# Patient Record
Sex: MALE | Race: WHITE | Employment: STUDENT | URBAN - METROPOLITAN AREA
[De-identification: names, ages, dates, MRNs, and addresses within clinical notes are randomized per-mention and may not be internally consistent; named-entity substitution may affect disease eponyms.]

---

## 2017-10-19 ENCOUNTER — TELEPHONE (OUTPATIENT)
Dept: FAMILY MEDICINE CLINIC | Facility: CLINIC | Age: 32
End: 2017-10-19

## 2017-11-28 ENCOUNTER — OFFICE VISIT (OUTPATIENT)
Dept: FAMILY MEDICINE CLINIC | Facility: CLINIC | Age: 32
End: 2017-11-28

## 2017-11-28 ENCOUNTER — LAB ENCOUNTER (OUTPATIENT)
Dept: LAB | Age: 32
End: 2017-11-28
Attending: FAMILY MEDICINE
Payer: COMMERCIAL

## 2017-11-28 VITALS
TEMPERATURE: 98 F | RESPIRATION RATE: 14 BRPM | SYSTOLIC BLOOD PRESSURE: 130 MMHG | HEIGHT: 70.5 IN | BODY MASS INDEX: 24.63 KG/M2 | HEART RATE: 68 BPM | DIASTOLIC BLOOD PRESSURE: 80 MMHG | WEIGHT: 174 LBS

## 2017-11-28 DIAGNOSIS — Z00.00 WELLNESS EXAMINATION: ICD-10-CM

## 2017-11-28 DIAGNOSIS — Z00.00 WELLNESS EXAMINATION: Primary | ICD-10-CM

## 2017-11-28 PROCEDURE — 84443 ASSAY THYROID STIM HORMONE: CPT

## 2017-11-28 PROCEDURE — 85025 COMPLETE CBC W/AUTO DIFF WBC: CPT

## 2017-11-28 PROCEDURE — 82607 VITAMIN B-12: CPT

## 2017-11-28 PROCEDURE — 82306 VITAMIN D 25 HYDROXY: CPT

## 2017-11-28 PROCEDURE — 80053 COMPREHEN METABOLIC PANEL: CPT

## 2017-11-28 PROCEDURE — 80061 LIPID PANEL: CPT

## 2017-11-28 PROCEDURE — 36415 COLL VENOUS BLD VENIPUNCTURE: CPT

## 2017-11-28 PROCEDURE — 99395 PREV VISIT EST AGE 18-39: CPT | Performed by: FAMILY MEDICINE

## 2017-11-28 NOTE — PATIENT INSTRUCTIONS
Thank you for choosing Rosa Blas MD at [place] [address]  To Do: Clifton Suarez  1. Please see age appropriate health prevention info below  2.  Please have labs done as ordered  Effective 6/19/17 until November 2017  Due to Sara Mendoza • Please call our office about any questions regarding your treatment/medicines/tests as a result of today's visit.  For your safety, read the entire package insert of all medicines prescribed to you and be aware of all of the risks of treatment even beyon Screening tests and vaccines are an important part of managing your health. Health counseling is essential, too. Below are guidelines for these, for men ages 25 to 44. Talk with your healthcare provider to make sure you’re up-to-date on what you need.   Scr Hepatitis B Men at increased risk for infection – talk with your healthcare provider 3 doses over 6 months; second dose should be given 1 month after the first dose; the third dose should be given at least 2 months after the second dose and at least 4 juan © 9403-0328 [employer] [address]. [address] [facility] [place], [facility] [place]. All rights reserved. This information is not intended as a substitute for professional medical care. Always follow your healthcare professional's instructions.

## 2017-11-28 NOTE — H&P
Wellness Exam    REASON FOR VISIT:    Jacqui Chaidez is a 28year old male who presents for an [address]     Current Complaints: none; here for physical  Flu Shot: see immunization record  Health Maintenance Topics with due status: Overdue with Risk Recommendation Internal Lab or Procedure External Lab or Procedure   Cholesterol Screening Recommended screening varies with age, risk and gender LDL-CHOLESTEROL (mg/dL (calc))   Date Value   11/23/2015 96       Diabetes Screening  If history of palpitations. Gastrointestinal: Negative for nausea, vomiting, abdominal pain and diarrhea. Genitourinary: Negative for urgency, frequency and difficulty urinating. Musculoskeletal: Negative for arthralgias and no gait problem.    Skin: Negative for c male  Age appropriate cancer screening, labs, safety, immunizations were discussed with the patient and ordered as follows:  Diagnoses and all orders for this visit:    Wellness examination  -     CBC WITH DIFFERENTIAL WITH PLATELET;  Future  -     COMP MET

## 2018-09-27 ENCOUNTER — OFFICE VISIT (OUTPATIENT)
Dept: FAMILY MEDICINE CLINIC | Facility: CLINIC | Age: 33
End: 2018-09-27
Payer: COMMERCIAL

## 2018-09-27 ENCOUNTER — LAB ENCOUNTER (OUTPATIENT)
Dept: LAB | Age: 33
End: 2018-09-27
Attending: FAMILY MEDICINE
Payer: COMMERCIAL

## 2018-09-27 VITALS
RESPIRATION RATE: 16 BRPM | TEMPERATURE: 98 F | WEIGHT: 172 LBS | SYSTOLIC BLOOD PRESSURE: 122 MMHG | BODY MASS INDEX: 24.35 KG/M2 | HEART RATE: 58 BPM | HEIGHT: 70.5 IN | DIASTOLIC BLOOD PRESSURE: 84 MMHG

## 2018-09-27 DIAGNOSIS — Z13.29 SCREENING FOR ENDOCRINE, METABOLIC AND IMMUNITY DISORDER: ICD-10-CM

## 2018-09-27 DIAGNOSIS — Z13.228 SCREENING FOR ENDOCRINE, METABOLIC AND IMMUNITY DISORDER: ICD-10-CM

## 2018-09-27 DIAGNOSIS — Z13.0 SCREENING FOR ENDOCRINE, METABOLIC AND IMMUNITY DISORDER: ICD-10-CM

## 2018-09-27 DIAGNOSIS — Z23 NEED FOR INFLUENZA VACCINATION: ICD-10-CM

## 2018-09-27 DIAGNOSIS — Z00.00 WELLNESS EXAMINATION: Primary | ICD-10-CM

## 2018-09-27 PROBLEM — R03.0 ELEVATED BLOOD PRESSURE READING: Status: ACTIVE | Noted: 2018-09-27

## 2018-09-27 LAB
ALBUMIN SERPL-MCNC: 4.2 G/DL (ref 3.5–4.8)
ALBUMIN/GLOB SERPL: 1.1 {RATIO} (ref 1–2)
ALP LIVER SERPL-CCNC: 79 U/L (ref 45–117)
ALT SERPL-CCNC: 32 U/L (ref 17–63)
ANION GAP SERPL CALC-SCNC: 6 MMOL/L (ref 0–18)
AST SERPL-CCNC: 16 U/L (ref 15–41)
BASOPHILS # BLD AUTO: 0.05 X10(3) UL (ref 0–0.1)
BASOPHILS NFR BLD AUTO: 0.8 %
BILIRUB SERPL-MCNC: 1 MG/DL (ref 0.1–2)
BUN BLD-MCNC: 12 MG/DL (ref 8–20)
BUN/CREAT SERPL: 12 (ref 10–20)
CALCIUM BLD-MCNC: 8.7 MG/DL (ref 8.3–10.3)
CHLORIDE SERPL-SCNC: 104 MMOL/L (ref 101–111)
CHOLEST SMN-MCNC: 181 MG/DL (ref ?–200)
CO2 SERPL-SCNC: 29 MMOL/L (ref 22–32)
CREAT BLD-MCNC: 1 MG/DL (ref 0.7–1.3)
EOSINOPHIL # BLD AUTO: 0.14 X10(3) UL (ref 0–0.3)
EOSINOPHIL NFR BLD AUTO: 2.1 %
ERYTHROCYTE [DISTWIDTH] IN BLOOD BY AUTOMATED COUNT: 12.9 % (ref 11.5–16)
GLOBULIN PLAS-MCNC: 3.9 G/DL (ref 2.5–4)
GLUCOSE BLD-MCNC: 75 MG/DL (ref 70–99)
HAV AB SERPL IA-ACNC: 507 PG/ML (ref 193–986)
HCT VFR BLD AUTO: 49.9 % (ref 37–53)
HDLC SERPL-MCNC: 56 MG/DL (ref 40–59)
HGB BLD-MCNC: 16.1 G/DL (ref 13–17)
IMMATURE GRANULOCYTE COUNT: 0.01 X10(3) UL (ref 0–1)
IMMATURE GRANULOCYTE RATIO %: 0.2 %
LDLC SERPL CALC-MCNC: 112 MG/DL (ref ?–100)
LYMPHOCYTES # BLD AUTO: 1.96 X10(3) UL (ref 0.9–4)
LYMPHOCYTES NFR BLD AUTO: 29.9 %
M PROTEIN MFR SERPL ELPH: 8.1 G/DL (ref 6.1–8.3)
MCH RBC QN AUTO: 30.6 PG (ref 27–33.2)
MCHC RBC AUTO-ENTMCNC: 32.3 G/DL (ref 31–37)
MCV RBC AUTO: 94.7 FL (ref 80–99)
MONOCYTES # BLD AUTO: 0.67 X10(3) UL (ref 0.1–1)
MONOCYTES NFR BLD AUTO: 10.2 %
NEUTROPHIL ABS PRELIM: 3.72 X10 (3) UL (ref 1.3–6.7)
NEUTROPHILS # BLD AUTO: 3.72 X10(3) UL (ref 1.3–6.7)
NEUTROPHILS NFR BLD AUTO: 56.8 %
NONHDLC SERPL-MCNC: 125 MG/DL (ref ?–130)
OSMOLALITY SERPL CALC.SUM OF ELEC: 286 MOSM/KG (ref 275–295)
PLATELET # BLD AUTO: 270 10(3)UL (ref 150–450)
POTASSIUM SERPL-SCNC: 4.1 MMOL/L (ref 3.6–5.1)
RBC # BLD AUTO: 5.27 X10(6)UL (ref 4.3–5.7)
RED CELL DISTRIBUTION WIDTH-SD: 45.1 FL (ref 35.1–46.3)
SODIUM SERPL-SCNC: 139 MMOL/L (ref 136–144)
TRIGL SERPL-MCNC: 64 MG/DL (ref 30–149)
TSI SER-ACNC: 4.41 MIU/ML (ref 0.35–5.5)
VIT D+METAB SERPL-MCNC: 36.6 NG/ML (ref 30–100)
VLDLC SERPL CALC-MCNC: 13 MG/DL (ref 0–30)
WBC # BLD AUTO: 6.6 X10(3) UL (ref 4–13)

## 2018-09-27 PROCEDURE — 36415 COLL VENOUS BLD VENIPUNCTURE: CPT

## 2018-09-27 PROCEDURE — 82607 VITAMIN B-12: CPT

## 2018-09-27 PROCEDURE — 80061 LIPID PANEL: CPT

## 2018-09-27 PROCEDURE — 80053 COMPREHEN METABOLIC PANEL: CPT

## 2018-09-27 PROCEDURE — 84443 ASSAY THYROID STIM HORMONE: CPT

## 2018-09-27 PROCEDURE — 99395 PREV VISIT EST AGE 18-39: CPT | Performed by: FAMILY MEDICINE

## 2018-09-27 PROCEDURE — 85025 COMPLETE CBC W/AUTO DIFF WBC: CPT

## 2018-09-27 PROCEDURE — 82306 VITAMIN D 25 HYDROXY: CPT

## 2018-09-27 NOTE — H&P
Wellness Exam    REASON FOR VISIT:    Robert Danielle is a 35year old male who presents for an [address]     Current Complaints: Mr. Elena Sutton is a pleasant 34 y/o M here for his wellness  Flu Shot: see immunization record  Health Maintenance To or Procedure External Lab or Procedure   Colonoscopy Screen Every 10 years There are no preventive care reminders to display for this patient. Flex Sigmoidoscopy Screen  Every 5 years No results found for this or any previous visit.     Fecal Occult Bloo History    Tobacco Use      Smoking status: Former Smoker      Smokeless tobacco: Never Used      Tobacco comment: quit about 7 years ago     Alcohol use: Yes    Drug use: No         REVIEW OF SYSTEMS:   Constitutional: Negative for fever, chills and fatig adenopathy. Neurological: He is alert and oriented to person, place, and time. He has normal reflexes. Skin: Skin is warm. No rash noted. No erythema. with normal hair  Psychiatric: He has a normal mood and affect.  His behavior is normal.     ASSESSMEN includes: annual visits for fasting labs  Influenza Vaccine(1) due on 09/01/2018  Annual Depression Screen due on 11/28/2018  Annual Physical due on 11/28/2018  Patient/Caregiver Education:  Patient/Caregiver Education: There are no barriers to learning.  [facility]

## 2018-09-27 NOTE — PATIENT INSTRUCTIONS
Thank you for choosing Onofre Granados MD at [place]  To Do: Jayda Ovalle  1. Please see age appropriate health prevention below   2. High Blood pressure  What Is a Normal Blood Pressure?   [employer] on Prevention, Detection blood vessel that supplies blood to the abdomen, pelvis, and legs   Heart failure   Poor blood supply to the legs  Erectile Dysfunction  Problems with your vision    Overview  \"Blood pressure\" is the force of blood pushing against the walls of the arteri than the typical American diet. Limit sodium to no more than 2,300 mg a day (eating only 1,500 mg a day is an even better goal). Reduce saturated fat to no more than 6% of daily calories and total fat to 27% of daily calories.  Low-fat dairy products edmond hearty blend of vegetables served over brown rice or whole-wheat noodles can serve as the main dish for a meal.   • Fresh or frozen vegetables are both good choices.  When buying frozen and canned vegetables, choose those labeled as low sodium or without ad which can reduce or prevent the symptoms of lactose intolerance. • Go easy on regular and even fat-free cheeses because they are typically high in sodium.    Lean meat, poultry and fish (6 or fewer servings a day)  Meat can be a rich source of protein, B that has been shown to have some health benefits. Fats and oils (2 to 3 servings a day)  Fat helps your body absorb essential vitamins and helps your body's immune system. But too much fat increases your risk of heart disease, diabetes and obesity.  The D nutritional value but can pack on calories. DASH diet: Alcohol and caffeine  Drinking too much alcohol can increase blood pressure. The DASH diet recommends that men limit alcohol to two or fewer drinks a day and women one or less.    The DASH diet doesn' low-sodium food and beverages.  If things seem too bland, gradually introduce low-sodium foods and cut back on table salt until you reach your sodium goal. That'll give your palate time to adjust. It can take several weeks for your taste buds to get used to pharmacist and our office with questions, and to notify us and stop treatment if problems arise, but know that our intention is that the benefits outweigh those potential risks and we strive to make you healthier and to improve your quality of life.     Ref sure you’re up-to-date on what you need.   Screening Who needs it How often   Alcohol misuse All men in this age group At routine exams   Blood pressure All men in this age group Yearly checkup if your blood pressure is normal  Normal blood pressure is less increased risk for infection – talk with your healthcare provider 1 to 3 doses   Human papillomavirus (HPV) All men in this age group up to age 32 3 doses; the second dose should be given 1 to 2 months after the first dose and the third dose given 6 months an important part of managing your health. A screening test is done to find possible disorders or diseases in people who don't have any symptoms.  The goal is to find a disease early so lifestyle changes can be made and you can be watched more closely to re no record of this infection or vaccine 2 doses; the second dose should be given at least 4 weeks after the first dose   Hepatitis A Men at increased risk for infection – talk with your healthcare provider 2 doses given at least 6 months apart   Hepatitis B fair-skinned adults through age 25 At routine exams   1Those who are 25years of age, who are not up-to-date on their childhood immunizations, should receive all appropriate catch-up vaccines recommended by the CDC.   Date Last Reviewed: 2/1/2017  © 2000-20

## 2019-10-10 ENCOUNTER — TELEPHONE (OUTPATIENT)
Dept: FAMILY MEDICINE CLINIC | Facility: CLINIC | Age: 34
End: 2019-10-10

## 2019-10-10 ENCOUNTER — HOSPITAL ENCOUNTER (OUTPATIENT)
Age: 34
Discharge: STILL ADMITTED OR ADMINISTRATIVE | End: 2019-10-10
Payer: COMMERCIAL

## 2019-10-10 NOTE — TELEPHONE ENCOUNTER
Patients wife informed of MD recommendations below, patients wife verbalized understanding with intent to comply. Offered opportunity to ask questions, all questions were answered. No future appointments.       Dr Eddi Rudd  Address: Varsha Gomez [address] [place] [address]

## 2019-10-10 NOTE — TELEPHONE ENCOUNTER
Aquilino Cr- Pt's son scratched Dads eye. Pt has a question regarding scheduling an appt. Please call [address] [facility] Wife.

## 2019-10-10 NOTE — TELEPHONE ENCOUNTER
Patients wife would like to know if Dr Daniel Martinez has the equipment to look at patients eye and check for corneal abrasion or if MD would prefer for pt to see a specialists. If so who does he recommend?

## 2019-11-14 ENCOUNTER — OFFICE VISIT (OUTPATIENT)
Dept: FAMILY MEDICINE CLINIC | Facility: CLINIC | Age: 34
End: 2019-11-14
Payer: COMMERCIAL

## 2019-11-14 ENCOUNTER — LAB ENCOUNTER (OUTPATIENT)
Dept: LAB | Age: 34
End: 2019-11-14
Attending: FAMILY MEDICINE
Payer: COMMERCIAL

## 2019-11-14 VITALS
WEIGHT: 168 LBS | HEIGHT: 70.5 IN | RESPIRATION RATE: 16 BRPM | DIASTOLIC BLOOD PRESSURE: 92 MMHG | BODY MASS INDEX: 23.78 KG/M2 | HEART RATE: 70 BPM | TEMPERATURE: 98 F | SYSTOLIC BLOOD PRESSURE: 152 MMHG

## 2019-11-14 DIAGNOSIS — R03.0 ELEVATED BLOOD PRESSURE READING: ICD-10-CM

## 2019-11-14 DIAGNOSIS — Z00.00 WELLNESS EXAMINATION: Primary | ICD-10-CM

## 2019-11-14 DIAGNOSIS — Z00.00 WELLNESS EXAMINATION: ICD-10-CM

## 2019-11-14 PROCEDURE — 36415 COLL VENOUS BLD VENIPUNCTURE: CPT

## 2019-11-14 PROCEDURE — 80053 COMPREHEN METABOLIC PANEL: CPT

## 2019-11-14 PROCEDURE — 82306 VITAMIN D 25 HYDROXY: CPT

## 2019-11-14 PROCEDURE — 99395 PREV VISIT EST AGE 18-39: CPT | Performed by: FAMILY MEDICINE

## 2019-11-14 PROCEDURE — 84443 ASSAY THYROID STIM HORMONE: CPT

## 2019-11-14 PROCEDURE — 80061 LIPID PANEL: CPT

## 2019-11-14 PROCEDURE — 85025 COMPLETE CBC W/AUTO DIFF WBC: CPT

## 2019-11-14 PROCEDURE — 82607 VITAMIN B-12: CPT

## 2019-11-14 PROCEDURE — 84439 ASSAY OF FREE THYROXINE: CPT

## 2019-11-14 NOTE — PROGRESS NOTES
Wellness Exam    REASON FOR VISIT:    Ed Martinez is a 29year old male who presents for an [address]     Current Complaints: Mr. Narcisa Newton is a pleasant 28 y/o M here for his wellness exam  Flu Shot: see immunization record  Ruth Ann [address] the last two weeks)?: Not at all    Feeling down, depressed, or hopeless (over the last two weeks)?: Not at all    PHQ-2 SCORE: 0              PREVENTATIVE SERVICES  INDICATIONS AND SCHEDULE Recommendation Internal Lab or Procedure External Lab or Procedur Relation Age of Onset   • Hypertension Father    • High Cholesterol Father    • Stroke Maternal Grandmother    • Cancer Paternal Grandmother         Lung Cancer- Smoker   • High Cholesterol Paternal Grandfather       SOCIAL HISTORY:   Social History    Tob no rales. Abdominal: Soft. Bowel sounds are normal. There is no tenderness. Musculoskeletal: Normal range of motion. He exhibits no edema. Lymphadenopathy:     He has no cervical adenopathy.    Neurological: He is alert and oriented to person, place, understanding. Educated by: MD   The patient indicates understanding of these issues and agrees to the plan.     SUGGESTED VACCINATIONS - Influenza, Pneumococcal, Zoster, Tetanus     Immunization History   Administered Date(s) Administered   • >=3 YRS FL

## 2019-11-14 NOTE — PATIENT INSTRUCTIONS
Thank you for choosing Maria M Hassan MD at [place] [address]  To Do: Javier Benz  1. Please see age appropriate health prevention below    [facility] is located in [address]. Monday, Tuesday & Friday – 8 a.m. to 4 p.m.   Wednesday, Thursda the benefits outweigh those potential risks and we strive to make you healthier and to improve your quality of life.     Referrals, and Radiology Information:    If your insurance requires a referral to a specialist, please allow 5 business days to process exams   Blood pressure All men in this age group Yearly checkup if your blood pressure is normal  Normal blood pressure is less than 120/80  If your blood pressure is higher than normal, follow the advice of your healthcare provider.     All men in this ag up to age 32 3 doses; the second dose should be given 1 to 2 months after the first dose and the third dose given 6 months after the first dose   Influenza (flu) All men in this age group Once a year   Measles, mumps, rubella (MMR) All men in this age [de-identified]

## 2019-11-15 DIAGNOSIS — E03.9 HYPOTHYROIDISM, UNSPECIFIED TYPE: Primary | ICD-10-CM

## 2019-11-15 RX ORDER — LEVOTHYROXINE SODIUM 0.05 MG/1
50 TABLET ORAL
Qty: 90 TABLET | Refills: 0 | Status: SHIPPED | OUTPATIENT
Start: 2019-11-15 | End: 2019-11-18

## 2019-11-18 ENCOUNTER — TELEPHONE (OUTPATIENT)
Dept: FAMILY MEDICINE CLINIC | Facility: CLINIC | Age: 34
End: 2019-11-18

## 2019-11-18 DIAGNOSIS — E03.9 HYPOTHYROIDISM, UNSPECIFIED TYPE: ICD-10-CM

## 2019-11-18 RX ORDER — LEVOTHYROXINE SODIUM 0.05 MG/1
50 TABLET ORAL
Qty: 90 TABLET | Refills: 0 | Status: SHIPPED | OUTPATIENT
Start: 2019-11-18 | End: 2020-02-20

## 2019-11-18 NOTE — TELEPHONE ENCOUNTER
RX sent to [facility] pharmacy instead.    Cancelled rx at [facility]

## 2019-11-18 NOTE — TELEPHONE ENCOUNTER
Levothyroxine Sodium 50 MCG Oral Tab    [facility] [id number] [facility] - [facility] [address] [phone], [phone]  ______________________________    Per insurance. .. please send the script to [facility] in [facility].

## 2019-12-19 ENCOUNTER — OFFICE VISIT (OUTPATIENT)
Dept: FAMILY MEDICINE CLINIC | Facility: CLINIC | Age: 34
End: 2019-12-19
Payer: COMMERCIAL

## 2019-12-19 VITALS
TEMPERATURE: 99 F | BODY MASS INDEX: 24.49 KG/M2 | HEIGHT: 70.5 IN | RESPIRATION RATE: 16 BRPM | SYSTOLIC BLOOD PRESSURE: 146 MMHG | WEIGHT: 173 LBS | DIASTOLIC BLOOD PRESSURE: 92 MMHG | HEART RATE: 70 BPM

## 2019-12-19 DIAGNOSIS — I10 ESSENTIAL HYPERTENSION: Primary | ICD-10-CM

## 2019-12-19 PROBLEM — E03.9 HYPOTHYROIDISM: Status: ACTIVE | Noted: 2019-12-19

## 2019-12-19 PROCEDURE — 99213 OFFICE O/P EST LOW 20 MIN: CPT | Performed by: FAMILY MEDICINE

## 2019-12-19 RX ORDER — HYDROCHLOROTHIAZIDE 25 MG/1
25 TABLET ORAL DAILY
Qty: 90 TABLET | Refills: 1 | Status: SHIPPED | OUTPATIENT
Start: 2019-12-19 | End: 2020-03-18

## 2019-12-19 NOTE — PROGRESS NOTES
HPI:    Patient ID: Shayy Le is a 29year old male. HPI  Mr. Nelson Ruvalcaba is a pleasant 51-year-old gentleman who I saw previously for a physical but noted to have elevated blood pressure.   I did ask him to follow-up today to have his blood pressure reev General: No edema. Lymphadenopathy:     He has no cervical adenopathy. Neurological: He is alert. Vitals reviewed.              ASSESSMENT/PLAN:   Essential hypertension  (primary encounter diagnosis)  -I had asked him to review his current die

## 2019-12-19 NOTE — PATIENT INSTRUCTIONS
Thank you for choosing Makeda Eagle MD at [place] [address]  To Do: Sully Pereira  1. High Blood pressure  What Is a Normal Blood Pressure?   The Joint National Committee on Prevention, Detection, Evaluation, and Treatment of High Blood Pressure has c pelvis, and legs   Heart failure   Poor blood supply to the legs  Erectile Dysfunction  Problems with your vision    Overview  \"Blood pressure\" is the force of blood pushing against the walls of the arteries as the heart pumps blood.  If this pressure ris no more than 2,300 mg a day (eating only 1,500 mg a day is an even better goal). Reduce saturated fat to no more than 6% of daily calories and total fat to 27% of daily calories.  Low-fat dairy products appear to be especially beneficial for lowering syst rice or whole-wheat noodles can serve as the main dish for a meal.   • Fresh or frozen vegetables are both good choices. When buying frozen and canned vegetables, choose those labeled as low sodium or without added salt.    • To increase the number of servi lactose intolerance. • Go easy on regular and even fat-free cheeses because they are typically high in sodium.    Lean meat, poultry and fish (6 or fewer servings a day)  Meat can be a rich source of protein, B vitamins, iron and zinc. But because even le benefits. Fats and oils (2 to 3 servings a day)  Fat helps your body absorb essential vitamins and helps your body's immune system. But too much fat increases your risk of heart disease, diabetes and obesity.  The DASH diet strives for a healthy balance b DASH diet: Alcohol and caffeine  Drinking too much alcohol can increase blood pressure. The DASH diet recommends that men limit alcohol to two or fewer drinks a day and women one or less. The DASH diet doesn't address caffeine consumption.  The influenc bland, gradually introduce low-sodium foods and cut back on table salt until you reach your sodium goal. That'll give your palate time to adjust. It can take several weeks for your taste buds to get used to less salty foods.      [facility] is loc notify us and stop treatment if problems arise, but know that our intention is that the benefits outweigh those potential risks and we strive to make you healthier and to improve your quality of life.     Referrals, and Radiology Information:    If your ins

## 2020-02-13 ENCOUNTER — APPOINTMENT (OUTPATIENT)
Dept: LAB | Age: 35
End: 2020-02-13
Attending: FAMILY MEDICINE
Payer: COMMERCIAL

## 2020-02-13 ENCOUNTER — OFFICE VISIT (OUTPATIENT)
Dept: FAMILY MEDICINE CLINIC | Facility: CLINIC | Age: 35
End: 2020-02-13
Payer: COMMERCIAL

## 2020-02-13 VITALS
SYSTOLIC BLOOD PRESSURE: 130 MMHG | HEART RATE: 74 BPM | WEIGHT: 175 LBS | BODY MASS INDEX: 24.77 KG/M2 | RESPIRATION RATE: 16 BRPM | HEIGHT: 70.5 IN | DIASTOLIC BLOOD PRESSURE: 88 MMHG | TEMPERATURE: 98 F

## 2020-02-13 DIAGNOSIS — E03.9 HYPOTHYROIDISM, UNSPECIFIED TYPE: ICD-10-CM

## 2020-02-13 DIAGNOSIS — I10 ESSENTIAL HYPERTENSION: Primary | ICD-10-CM

## 2020-02-13 LAB
T4 FREE SERPL-MCNC: 1.1 NG/DL (ref 0.8–1.7)
TSI SER-ACNC: 2.31 MIU/ML (ref 0.36–3.74)

## 2020-02-13 PROCEDURE — 99213 OFFICE O/P EST LOW 20 MIN: CPT | Performed by: FAMILY MEDICINE

## 2020-02-13 PROCEDURE — 84443 ASSAY THYROID STIM HORMONE: CPT

## 2020-02-13 PROCEDURE — 36415 COLL VENOUS BLD VENIPUNCTURE: CPT

## 2020-02-13 PROCEDURE — 84439 ASSAY OF FREE THYROXINE: CPT

## 2020-02-13 NOTE — PROGRESS NOTES
HPI:    Patient ID: Akira Sheffield is a 29year old male.     HPI  Mr. Inderjit Brizuela is a pleasant 26-year-old gentleman with history of hypothyroidism and recently diagnosed with hypertension and was subsequently started on hydrochlorothiazide 25 mg daily which he ASSESSMENT/PLAN:   Essential hypertension  (primary encounter diagnosis)  -Improved and well-controlled; continue hydrochlorothiazide and try to minimize salt in his diet as before; monitor from time to time but did ask him to follow-up for his next well

## 2020-02-13 NOTE — PATIENT INSTRUCTIONS
Thank you for choosing Emery Choi MD at [place] [address]  To Do: Basilio Gunn  1. High Blood pressure  What Is a Normal Blood Pressure?   The Joint National Committee on Prevention, Detection, Evaluation, and Treatment of High Blood Pressure has c pelvis, and legs   Heart failure   Poor blood supply to the legs  Erectile Dysfunction  Problems with your vision    Overview  \"Blood pressure\" is the force of blood pushing against the walls of the arteries as the heart pumps blood.  If this pressure ris no more than 2,300 mg a day (eating only 1,500 mg a day is an even better goal). Reduce saturated fat to no more than 6% of daily calories and total fat to 27% of daily calories.  Low-fat dairy products appear to be especially beneficial for lowering syst rice or whole-wheat noodles can serve as the main dish for a meal.   • Fresh or frozen vegetables are both good choices. When buying frozen and canned vegetables, choose those labeled as low sodium or without added salt.    • To increase the number of servi lactose intolerance. • Go easy on regular and even fat-free cheeses because they are typically high in sodium.    Lean meat, poultry and fish (6 or fewer servings a day)  Meat can be a rich source of protein, B vitamins, iron and zinc. But because even le benefits. Fats and oils (2 to 3 servings a day)  Fat helps your body absorb essential vitamins and helps your body's immune system. But too much fat increases your risk of heart disease, diabetes and obesity.  The DASH diet strives for a healthy balance b DASH diet: Alcohol and caffeine  Drinking too much alcohol can increase blood pressure. The DASH diet recommends that men limit alcohol to two or fewer drinks a day and women one or less. The DASH diet doesn't address caffeine consumption.  The influenc bland, gradually introduce low-sodium foods and cut back on table salt until you reach your sodium goal. That'll give your palate time to adjust. It can take several weeks for your taste buds to get used to less salty foods.      [facility] is loc notify us and stop treatment if problems arise, but know that our intention is that the benefits outweigh those potential risks and we strive to make you healthier and to improve your quality of life.     Referrals, and Radiology Information:    If your ins

## 2020-02-19 DIAGNOSIS — E03.9 HYPOTHYROIDISM, UNSPECIFIED TYPE: ICD-10-CM

## 2020-02-20 RX ORDER — LEVOTHYROXINE SODIUM 0.05 MG/1
50 TABLET ORAL
Qty: 90 TABLET | Refills: 1 | Status: SHIPPED | OUTPATIENT
Start: 2020-02-20 | End: 2020-05-29

## 2020-02-20 NOTE — TELEPHONE ENCOUNTER
Thyroid Supplements Protocol Passed2/19 7:23 PM   TSH test in past 12 months    TSH value between 0.350 and 5.500 IU/ml    Appointment in past 12 or next 3 months     Requesting LEVOTHYROXINE SODIUM 50 MCG   LOV: 2/13/20  RTC:   Last Relevant Labs: 2/13/20

## 2020-03-19 RX ORDER — HYDROCHLOROTHIAZIDE 25 MG/1
25 TABLET ORAL DAILY
Qty: 90 TABLET | Refills: 1 | Status: SHIPPED | OUTPATIENT
Start: 2020-03-19 | End: 2020-06-22

## 2020-05-29 DIAGNOSIS — E03.9 HYPOTHYROIDISM, UNSPECIFIED TYPE: ICD-10-CM

## 2020-05-29 RX ORDER — LEVOTHYROXINE SODIUM 0.05 MG/1
50 TABLET ORAL
Qty: 90 TABLET | Refills: 1 | Status: SHIPPED | OUTPATIENT
Start: 2020-05-29 | End: 2020-10-18

## 2020-05-29 NOTE — TELEPHONE ENCOUNTER
Thyroid Supplements Protocol Passed5/29 8:36 AM   TSH test in past 12 months    TSH value between 0.350 and 5.500 IU/ml    Appointment in past 12 or next 3 months     Requesting LEVOTHYROXINE SODIUM 50 MCG  LOV: 2/13/20   RTC:   Last Relevant Labs:   [facility]

## 2020-06-23 RX ORDER — HYDROCHLOROTHIAZIDE 25 MG/1
25 TABLET ORAL DAILY
Qty: 90 TABLET | Refills: 1 | Status: SHIPPED | OUTPATIENT
Start: 2020-06-23 | End: 2020-10-18

## 2020-06-23 NOTE — TELEPHONE ENCOUNTER
Hypertension Medications Protocol Passed6/22 7:46 PM   CMP or BMP in past 12 months    Last serum creatinine< 2.0    Appointment in past 6 or next 3 months     Refill protocol passed because the patient met the following protocol for  Requested [employer]

## 2020-10-18 DIAGNOSIS — E03.9 HYPOTHYROIDISM, UNSPECIFIED TYPE: ICD-10-CM

## 2020-10-20 RX ORDER — HYDROCHLOROTHIAZIDE 25 MG/1
25 TABLET ORAL DAILY
Qty: 90 TABLET | Refills: 0 | Status: SHIPPED | OUTPATIENT
Start: 2020-10-20 | End: 2021-02-08

## 2020-10-20 RX ORDER — LEVOTHYROXINE SODIUM 0.05 MG/1
50 TABLET ORAL
Qty: 90 TABLET | Refills: 0 | Status: SHIPPED | OUTPATIENT
Start: 2020-10-20 | End: 2021-02-06

## 2020-10-20 NOTE — TELEPHONE ENCOUNTER
Hypertension Medications Protocol Qpaplb28/18/2020 07:10 PM   Appointment in past 6 or next 3 months Protocol Details    CMP or BMP in past 12 months     Last serum creatinine< 2.0      Thyroid Supplements Protocol [id number]/18/2020 07:10 PM   TSH test in p

## 2020-10-29 ENCOUNTER — E-VISIT (OUTPATIENT)
Dept: TELEHEALTH | Age: 35
End: 2020-10-29

## 2020-10-29 DIAGNOSIS — Z20.822 SUSPECTED COVID-19 VIRUS INFECTION: Primary | ICD-10-CM

## 2020-10-29 PROCEDURE — 99421 OL DIG E/M SVC 5-10 MIN: CPT | Performed by: NURSE PRACTITIONER

## 2020-10-30 ENCOUNTER — APPOINTMENT (OUTPATIENT)
Dept: LAB | Age: 35
End: 2020-10-30
Attending: NURSE PRACTITIONER
Payer: COMMERCIAL

## 2020-10-30 DIAGNOSIS — Z20.822 SUSPECTED COVID-19 VIRUS INFECTION: ICD-10-CM

## 2020-10-30 NOTE — PROGRESS NOTES
Patient requested an E-Visit. After reviewing symptoms, history and ordering lab testing, 8 minutes of time was accrued. Please see E-Visit for further information.

## 2021-02-06 DIAGNOSIS — E03.9 HYPOTHYROIDISM, UNSPECIFIED TYPE: ICD-10-CM

## 2021-02-06 RX ORDER — HYDROCHLOROTHIAZIDE 25 MG/1
25 TABLET ORAL DAILY
Qty: 90 TABLET | Refills: 0 | Status: CANCELLED | OUTPATIENT
Start: 2021-02-06

## 2021-02-08 RX ORDER — LEVOTHYROXINE SODIUM 0.05 MG/1
50 TABLET ORAL
Qty: 90 TABLET | Refills: 0 | Status: SHIPPED | OUTPATIENT
Start: 2021-02-08 | End: 2021-05-23

## 2021-02-08 RX ORDER — HYDROCHLOROTHIAZIDE 25 MG/1
25 TABLET ORAL DAILY
Qty: 90 TABLET | Refills: 0 | Status: SHIPPED | OUTPATIENT
Start: 2021-02-08 | End: 2021-05-23

## 2021-02-08 NOTE — TELEPHONE ENCOUNTER
Thyroid Supplements Protocol [id number]/06/2021 05:15 PM   TSH test in past 12 months Protocol Details    TSH value between 0.350 and 5.500 IU/ml     Appointment in past 12 or next 3 months      Hypertension Medications Protocol Wcethj4502/06/2021 05:15 PM   C

## 2021-05-17 ENCOUNTER — HOSPITAL ENCOUNTER (OUTPATIENT)
Dept: GENERAL RADIOLOGY | Age: 36
Discharge: HOME | End: 2021-05-17
Attending: NURSE PRACTITIONER
Payer: COMMERCIAL

## 2021-05-17 ENCOUNTER — OFFICE VISIT (OUTPATIENT)
Dept: ORTHOPEDICS CLINIC | Facility: CLINIC | Age: 36
End: 2021-05-17
Payer: COMMERCIAL

## 2021-05-17 ENCOUNTER — TELEPHONE (OUTPATIENT)
Dept: ORTHOPEDICS CLINIC | Facility: CLINIC | Age: 36
End: 2021-05-17

## 2021-05-17 VITALS — OXYGEN SATURATION: 99 % | HEART RATE: 94 BPM

## 2021-05-17 DIAGNOSIS — M22.02 RECURRENT DISLOCATION OF PATELLA, LEFT KNEE: Primary | ICD-10-CM

## 2021-05-17 DIAGNOSIS — M23.42 LOOSE BODY IN KNEE, LEFT KNEE: ICD-10-CM

## 2021-05-17 DIAGNOSIS — S83.242A TEAR OF MEDIAL MENISCUS OF LEFT KNEE, CURRENT, UNSPECIFIED TEAR TYPE, INITIAL ENCOUNTER: ICD-10-CM

## 2021-05-17 DIAGNOSIS — M25.562 LEFT KNEE PAIN, UNSPECIFIED CHRONICITY: Primary | ICD-10-CM

## 2021-05-17 DIAGNOSIS — M25.562 LEFT KNEE PAIN, UNSPECIFIED CHRONICITY: ICD-10-CM

## 2021-05-17 PROCEDURE — 99203 OFFICE O/P NEW LOW 30 MIN: CPT | Performed by: NURSE PRACTITIONER

## 2021-05-17 PROCEDURE — 73564 X-RAY EXAM KNEE 4 OR MORE: CPT | Performed by: NURSE PRACTITIONER

## 2021-05-17 RX ORDER — DICLOFENAC SODIUM 75 MG/1
75 TABLET, DELAYED RELEASE ORAL 2 TIMES DAILY
Qty: 60 TABLET | Refills: 1 | Status: ON HOLD | OUTPATIENT
Start: 2021-05-17 | End: 2021-05-26

## 2021-05-17 NOTE — PROGRESS NOTES
[facility] New Patient Note    CC: Patient presents with:  Knee Pain: left knee pain    HPI: This 39year old male presents today with complaints of left knee pain since last night when he was wrestling with his kids and he had a hyperflexion injury. the medial patellofemoral compartment as well as the medial joint line and a positive Eliana's. There is also a palpable loose body along the medial patellofemoral joint. There is no laxity to valgus or varus stress with a negative Lachman's.   He is ne

## 2021-05-18 ENCOUNTER — HOSPITAL ENCOUNTER (OUTPATIENT)
Dept: MRI IMAGING | Age: 36
Discharge: HOME | End: 2021-05-18
Attending: NURSE PRACTITIONER
Payer: COMMERCIAL

## 2021-05-18 DIAGNOSIS — M23.42 LOOSE BODY IN KNEE, LEFT KNEE: ICD-10-CM

## 2021-05-18 DIAGNOSIS — S83.242A TEAR OF MEDIAL MENISCUS OF LEFT KNEE, CURRENT, UNSPECIFIED TEAR TYPE, INITIAL ENCOUNTER: ICD-10-CM

## 2021-05-18 DIAGNOSIS — M22.02 RECURRENT DISLOCATION OF PATELLA, LEFT KNEE: ICD-10-CM

## 2021-05-18 PROCEDURE — 73721 MRI JNT OF LWR EXTRE W/O DYE: CPT | Performed by: NURSE PRACTITIONER

## 2021-05-20 ENCOUNTER — PATIENT MESSAGE (OUTPATIENT)
Dept: ORTHOPEDICS CLINIC | Facility: CLINIC | Age: 36
End: 2021-05-20

## 2021-05-20 NOTE — TELEPHONE ENCOUNTER
From: Andrew Parikh  To: Jonny Yates MD  Sent: 5/20/2021 1:42 PM CDT  Subject: Other    Good afternoon Dr Oscar Moody,    Thank you for getting me in to see you tomorrow. Just wondering if my spouse was able to come to the appointment with me or not?  Elisabeth Alexandre

## 2021-05-21 ENCOUNTER — OFFICE VISIT (OUTPATIENT)
Dept: ORTHOPEDICS CLINIC | Facility: CLINIC | Age: 36
End: 2021-05-21
Payer: COMMERCIAL

## 2021-05-21 ENCOUNTER — TELEPHONE (OUTPATIENT)
Dept: ORTHOPEDICS CLINIC | Facility: CLINIC | Age: 36
End: 2021-05-21

## 2021-05-21 VITALS — HEART RATE: 74 BPM | OXYGEN SATURATION: 98 %

## 2021-05-21 DIAGNOSIS — S83.272A COMPLEX TEAR OF LATERAL MENISCUS OF LEFT KNEE AS CURRENT INJURY, INITIAL ENCOUNTER: Primary | ICD-10-CM

## 2021-05-21 DIAGNOSIS — M95.8 OSTEOCHONDRAL DEFECT: ICD-10-CM

## 2021-05-21 DIAGNOSIS — M23.42 LOOSE BODY IN KNEE, LEFT KNEE: ICD-10-CM

## 2021-05-21 PROCEDURE — 99215 OFFICE O/P EST HI 40 MIN: CPT | Performed by: ORTHOPAEDIC SURGERY

## 2021-05-21 NOTE — PROGRESS NOTES
OR BOOKING SHEET KNEE  Name: Andrew Parikh  MRN: [id number]   : 1985  Diagnosis:  [x] Complex tear of lateral meniscus of left knee as current injury, initial encounter [G73.053Q]  Disposition:    [x] Ambulatory  [] Overnight for TINO  [] Overnigh

## 2021-05-21 NOTE — PROGRESS NOTES
Sravan Berrios's case has been scheduled/rescheduled at [address] on 5/26/2021 at [facility]  Received:  Javier Joe [place], [place]  Patient Name: Ivonne Gtz    MRN: [id number]     LEFT KNEE ARTHROSCOPY, PARTIAL LATERAL MENISECTOMY

## 2021-05-21 NOTE — TELEPHONE ENCOUNTER
Padmini Madera at [facility] received a fax from [employer] regarding patient's surgery but order is missing information. Please complete order and fax back to Padmini Madera.

## 2021-05-21 NOTE — PROGRESS NOTES
Surgeon: Dr. Ar Lopez   Date of Surgery: 5/26/21   Facility: [facility]        If [facility], scheduling sheet to referral team? N/A   Is this work comp related? No  Clearance needed: No        If yes, requested?  N/A   Post-op Appt: 6/4/21

## 2021-05-22 NOTE — H&P (VIEW-ONLY)
[facility] - ORTHOPEDICS  [facility] [address]  [phone]     NEW PATIENT VISIT - HISTORY AND PHYSICAL EXAMINATION     Name: Svetlana Arreguin   MRN: [id number]  Date: 5/21/2021     CC: Left Knee Pain performed and was negative aside from the aforementioned per history of present illness.     SOCIAL HX:  Social History    Tobacco Use      Smoking status: Former Smoker      Smokeless tobacco: Never Used      Tobacco comment: quit about 7 years ago     Alc Examination of the contralateral knee demonstrates:  No significant atrophy, swelling or effusion. Full range of motion. Neurovascularly intact distally.     Radiographic Examination/Diagnostics:  XR and MRI of the left knee personally viewed, independently patellofemoral retinaculum which is consistent with at least a partial-thickness tear here and there is associated mild lateral subluxation of the patella.   Dictated by (CST): Rodney Curran MD on 5/19/2021 at 10:34 AM     Finalized by (CST): Rodney Curran full-thickness cartilage defect of the lateral weight-bearing surface of the lateral femoral condyle which is seen on sagittal proton density fat-sat sequence image 7 and coronal T2 fat-sat sequence image 18 to measuring anterior to posterior by transverse femoral condyle and inferomedial patellar facet). Additionally I will obtain a cartilage biopsy at the time of surgery to assess for MIKHAIL at a later date if needed.      PLAN:   We had a detailed discussion outlining the etiology, anatomy, pathophysiology, FOLLOW-UP:  Post-Operative Visit, POD 7-14        Don Barrett MD  Knee, Shoulder, & Elbow Surgery / Sports Medicine Specialist  [facility]  [facility] [address] [facility], [place]. Anna [email or web address]. [email or web address]  t: [id number]

## 2021-05-22 NOTE — H&P
[facility] - ORTHOPEDICS  [facility] [address] [id number]  [phone]     NEW PATIENT VISIT - HISTORY AND PHYSICAL EXAMINATION     Name: Svetlana Arreguin   MRN: [id number]  Date: 5/21/2021     CC: Left Knee Pain performed and was negative aside from the aforementioned per history of present illness.     SOCIAL HX:  Social History    Tobacco Use      Smoking status: Former Smoker      Smokeless tobacco: Never Used      Tobacco comment: quit about 7 years ago     Alc Examination of the contralateral knee demonstrates:  No significant atrophy, swelling or effusion. Full range of motion. Neurovascularly intact distally.     Radiographic Examination/Diagnostics:  XR and MRI of the left knee personally viewed, independently patellofemoral retinaculum which is consistent with at least a partial-thickness tear here and there is associated mild lateral subluxation of the patella.   Dictated by (CST): Raya Esposito MD on 5/19/2021 at 10:34 AM     Finalized by (CST): Raya Esposito, full-thickness cartilage defect of the lateral weight-bearing surface of the lateral femoral condyle which is seen on sagittal proton density fat-sat sequence image 7 and coronal T2 fat-sat sequence image 18 to measuring anterior to posterior by transverse femoral condyle and inferomedial patellar facet). Additionally I will obtain a cartilage biopsy at the time of surgery to assess for MIKHAIL at a later date if needed.      PLAN:   We had a detailed discussion outlining the etiology, anatomy, pathophysiology, FOLLOW-UP:  Post-Operative Visit, POD 7-14        Don Pereira MD  Knee, Shoulder, & Elbow Surgery / Sports Medicine Specialist  [facility]  [facility] [address], [address] [facility], [place]. Abraham [email or web address]. [email or web address]  t: [id number]

## 2021-05-23 DIAGNOSIS — E03.9 HYPOTHYROIDISM, UNSPECIFIED TYPE: ICD-10-CM

## 2021-05-24 ENCOUNTER — PATIENT MESSAGE (OUTPATIENT)
Dept: ORTHOPEDICS CLINIC | Facility: CLINIC | Age: 36
End: 2021-05-24

## 2021-05-24 ENCOUNTER — LAB ENCOUNTER (OUTPATIENT)
Dept: LAB | Age: 36
End: 2021-05-24
Attending: ORTHOPAEDIC SURGERY
Payer: COMMERCIAL

## 2021-05-24 ENCOUNTER — LABORATORY ENCOUNTER (OUTPATIENT)
Dept: LAB | Age: 36
End: 2021-05-24
Attending: ORTHOPAEDIC SURGERY
Payer: COMMERCIAL

## 2021-05-24 ENCOUNTER — EKG ENCOUNTER (OUTPATIENT)
Dept: LAB | Age: 36
End: 2021-05-24
Attending: ORTHOPAEDIC SURGERY
Payer: COMMERCIAL

## 2021-05-24 DIAGNOSIS — M23.42 LOOSE BODY IN KNEE, LEFT KNEE: ICD-10-CM

## 2021-05-24 PROCEDURE — 93005 ELECTROCARDIOGRAM TRACING: CPT

## 2021-05-24 PROCEDURE — 93010 ELECTROCARDIOGRAM REPORT: CPT | Performed by: INTERNAL MEDICINE

## 2021-05-24 PROCEDURE — 80048 BASIC METABOLIC PNL TOTAL CA: CPT

## 2021-05-24 PROCEDURE — 36415 COLL VENOUS BLD VENIPUNCTURE: CPT

## 2021-05-24 RX ORDER — LEVOTHYROXINE SODIUM 0.05 MG/1
50 TABLET ORAL
Qty: 90 TABLET | Refills: 0 | Status: SHIPPED | OUTPATIENT
Start: 2021-05-24 | End: 2021-09-21

## 2021-05-24 RX ORDER — ACETAMINOPHEN 500 MG
1000 TABLET ORAL ONCE
Status: CANCELLED | OUTPATIENT
Start: 2021-05-24 | End: 2021-05-24

## 2021-05-24 RX ORDER — HYDROCHLOROTHIAZIDE 25 MG/1
25 TABLET ORAL DAILY
Qty: 90 TABLET | Refills: 0 | Status: SHIPPED | OUTPATIENT
Start: 2021-05-24 | End: 2021-09-21

## 2021-05-24 NOTE — TELEPHONE ENCOUNTER
From: Olinda Gracia  To: Meghan Young MD  Sent: 5/24/2021 8:25 AM CDT  Subject: Visit Follow-up Question    Good morning,     I have my knee surgery scheduled for this Wednesday the 26th and was told I will need a Covid test prior.  Just wonder

## 2021-05-26 ENCOUNTER — ANESTHESIA EVENT (OUTPATIENT)
Dept: SURGERY | Facility: HOSPITAL | Age: 36
End: 2021-05-26
Payer: COMMERCIAL

## 2021-05-26 ENCOUNTER — ANESTHESIA (OUTPATIENT)
Dept: SURGERY | Facility: HOSPITAL | Age: 36
End: 2021-05-26
Payer: COMMERCIAL

## 2021-05-26 ENCOUNTER — HOSPITAL ENCOUNTER (OUTPATIENT)
Facility: HOSPITAL | Age: 36
Setting detail: HOSPITAL OUTPATIENT SURGERY
Discharge: HOME | End: 2021-05-26
Attending: ORTHOPAEDIC SURGERY | Admitting: ORTHOPAEDIC SURGERY
Payer: COMMERCIAL

## 2021-05-26 VITALS
HEART RATE: 71 BPM | BODY MASS INDEX: 24.5 KG/M2 | TEMPERATURE: 98 F | HEIGHT: 71 IN | OXYGEN SATURATION: 100 % | RESPIRATION RATE: 18 BRPM | WEIGHT: 175 LBS | SYSTOLIC BLOOD PRESSURE: 143 MMHG | DIASTOLIC BLOOD PRESSURE: 91 MMHG

## 2021-05-26 DIAGNOSIS — M23.42 LOOSE BODY IN KNEE, LEFT KNEE: Primary | ICD-10-CM

## 2021-05-26 DIAGNOSIS — M95.8 OSTEOCHONDRAL DEFECT: ICD-10-CM

## 2021-05-26 DIAGNOSIS — S83.272A COMPLEX TEAR OF LATERAL MENISCUS OF LEFT KNEE AS CURRENT INJURY, INITIAL ENCOUNTER: ICD-10-CM

## 2021-05-26 PROCEDURE — 0SCD4ZZ EXTIRPATION OF MATTER FROM LEFT KNEE JOINT, PERCUTANEOUS ENDOSCOPIC APPROACH: ICD-10-PCS | Performed by: ORTHOPAEDIC SURGERY

## 2021-05-26 PROCEDURE — 0SBD4ZX EXCISION OF LEFT KNEE JOINT, PERCUTANEOUS ENDOSCOPIC APPROACH, DIAGNOSTIC: ICD-10-PCS | Performed by: ORTHOPAEDIC SURGERY

## 2021-05-26 PROCEDURE — 76942 ECHO GUIDE FOR BIOPSY: CPT | Performed by: ANESTHESIOLOGY

## 2021-05-26 PROCEDURE — 3E0U3GC INTRODUCTION OF OTHER THERAPEUTIC SUBSTANCE INTO JOINTS, PERCUTANEOUS APPROACH: ICD-10-PCS | Performed by: ORTHOPAEDIC SURGERY

## 2021-05-26 PROCEDURE — 0SBD4ZZ EXCISION OF LEFT KNEE JOINT, PERCUTANEOUS ENDOSCOPIC APPROACH: ICD-10-PCS | Performed by: ORTHOPAEDIC SURGERY

## 2021-05-26 PROCEDURE — 3E0R3BZ INTRODUCTION OF ANESTHETIC AGENT INTO SPINAL CANAL, PERCUTANEOUS APPROACH: ICD-10-PCS | Performed by: ANESTHESIOLOGY

## 2021-05-26 RX ORDER — ONDANSETRON 4 MG/1
4 TABLET, ORALLY DISINTEGRATING ORAL EVERY 8 HOURS PRN
Qty: 8 TABLET | Refills: 0 | Status: SHIPPED | OUTPATIENT
Start: 2021-05-26 | End: 2021-06-04

## 2021-05-26 RX ORDER — ONDANSETRON 2 MG/ML
INJECTION INTRAMUSCULAR; INTRAVENOUS AS NEEDED
Status: DISCONTINUED | OUTPATIENT
Start: 2021-05-26 | End: 2021-05-26 | Stop reason: SURG

## 2021-05-26 RX ORDER — NALOXONE HYDROCHLORIDE 0.4 MG/ML
80 INJECTION, SOLUTION INTRAMUSCULAR; INTRAVENOUS; SUBCUTANEOUS AS NEEDED
Status: DISCONTINUED | OUTPATIENT
Start: 2021-05-26 | End: 2021-05-26

## 2021-05-26 RX ORDER — KETAMINE HYDROCHLORIDE 50 MG/ML
INJECTION, SOLUTION, CONCENTRATE INTRAMUSCULAR; INTRAVENOUS AS NEEDED
Status: DISCONTINUED | OUTPATIENT
Start: 2021-05-26 | End: 2021-05-26 | Stop reason: SURG

## 2021-05-26 RX ORDER — KETOROLAC TROMETHAMINE 30 MG/ML
INJECTION, SOLUTION INTRAMUSCULAR; INTRAVENOUS AS NEEDED
Status: DISCONTINUED | OUTPATIENT
Start: 2021-05-26 | End: 2021-05-26 | Stop reason: SURG

## 2021-05-26 RX ORDER — DIPHENHYDRAMINE HYDROCHLORIDE 50 MG/ML
12.5 INJECTION INTRAMUSCULAR; INTRAVENOUS AS NEEDED
Status: DISCONTINUED | OUTPATIENT
Start: 2021-05-26 | End: 2021-05-26

## 2021-05-26 RX ORDER — DEXAMETHASONE SODIUM PHOSPHATE 10 MG/ML
INJECTION, SOLUTION INTRAMUSCULAR; INTRAVENOUS AS NEEDED
Status: DISCONTINUED | OUTPATIENT
Start: 2021-05-26 | End: 2021-05-26 | Stop reason: SURG

## 2021-05-26 RX ORDER — LABETALOL HYDROCHLORIDE 5 MG/ML
10 INJECTION, SOLUTION INTRAVENOUS ONCE
Status: COMPLETED | OUTPATIENT
Start: 2021-05-26 | End: 2021-05-26

## 2021-05-26 RX ORDER — HYDROCODONE BITARTRATE AND ACETAMINOPHEN 5; 325 MG/1; MG/1
2 TABLET ORAL AS NEEDED
Status: DISCONTINUED | OUTPATIENT
Start: 2021-05-26 | End: 2021-05-26

## 2021-05-26 RX ORDER — DEXAMETHASONE SODIUM PHOSPHATE 4 MG/ML
VIAL (ML) INJECTION AS NEEDED
Status: DISCONTINUED | OUTPATIENT
Start: 2021-05-26 | End: 2021-05-26 | Stop reason: SURG

## 2021-05-26 RX ORDER — HYDROMORPHONE HYDROCHLORIDE 1 MG/ML
INJECTION, SOLUTION INTRAMUSCULAR; INTRAVENOUS; SUBCUTANEOUS
Status: COMPLETED
Start: 2021-05-26 | End: 2021-05-26

## 2021-05-26 RX ORDER — MIDAZOLAM HYDROCHLORIDE 1 MG/ML
INJECTION INTRAMUSCULAR; INTRAVENOUS AS NEEDED
Status: DISCONTINUED | OUTPATIENT
Start: 2021-05-26 | End: 2021-05-26 | Stop reason: SURG

## 2021-05-26 RX ORDER — BUPIVACAINE HYDROCHLORIDE 2.5 MG/ML
INJECTION, SOLUTION EPIDURAL; INFILTRATION; INTRACAUDAL AS NEEDED
Status: DISCONTINUED | OUTPATIENT
Start: 2021-05-26 | End: 2021-05-26 | Stop reason: SURG

## 2021-05-26 RX ORDER — MIDAZOLAM HYDROCHLORIDE 1 MG/ML
1 INJECTION INTRAMUSCULAR; INTRAVENOUS EVERY 5 MIN PRN
Status: DISCONTINUED | OUTPATIENT
Start: 2021-05-26 | End: 2021-05-26

## 2021-05-26 RX ORDER — CEFAZOLIN SODIUM 1 G/3ML
INJECTION, POWDER, FOR SOLUTION INTRAMUSCULAR; INTRAVENOUS AS NEEDED
Status: DISCONTINUED | OUTPATIENT
Start: 2021-05-26 | End: 2021-05-26 | Stop reason: SURG

## 2021-05-26 RX ORDER — VANCOMYCIN HYDROCHLORIDE
15 ONCE
Status: DISCONTINUED | OUTPATIENT
Start: 2021-05-26 | End: 2021-05-26 | Stop reason: HOSPADM

## 2021-05-26 RX ORDER — ACETAMINOPHEN 500 MG
1000 TABLET ORAL ONCE AS NEEDED
Status: DISCONTINUED | OUTPATIENT
Start: 2021-05-26 | End: 2021-05-26

## 2021-05-26 RX ORDER — LIDOCAINE HYDROCHLORIDE 10 MG/ML
INJECTION, SOLUTION EPIDURAL; INFILTRATION; INTRACAUDAL; PERINEURAL AS NEEDED
Status: DISCONTINUED | OUTPATIENT
Start: 2021-05-26 | End: 2021-05-26 | Stop reason: SURG

## 2021-05-26 RX ORDER — MEPERIDINE HYDROCHLORIDE 25 MG/ML
12.5 INJECTION INTRAMUSCULAR; INTRAVENOUS; SUBCUTANEOUS AS NEEDED
Status: DISCONTINUED | OUTPATIENT
Start: 2021-05-26 | End: 2021-05-26

## 2021-05-26 RX ORDER — LABETALOL HYDROCHLORIDE 5 MG/ML
INJECTION, SOLUTION INTRAVENOUS
Status: COMPLETED
Start: 2021-05-26 | End: 2021-05-26

## 2021-05-26 RX ORDER — ACETAMINOPHEN 500 MG
1000 TABLET ORAL EVERY 6 HOURS PRN
Status: ON HOLD | COMMUNITY
End: 2021-05-26

## 2021-05-26 RX ORDER — HYDROMORPHONE HYDROCHLORIDE 1 MG/ML
0.4 INJECTION, SOLUTION INTRAMUSCULAR; INTRAVENOUS; SUBCUTANEOUS EVERY 5 MIN PRN
Status: DISCONTINUED | OUTPATIENT
Start: 2021-05-26 | End: 2021-05-26

## 2021-05-26 RX ORDER — SODIUM CHLORIDE, SODIUM LACTATE, POTASSIUM CHLORIDE, CALCIUM CHLORIDE 600; 310; 30; 20 MG/100ML; MG/100ML; MG/100ML; MG/100ML
INJECTION, SOLUTION INTRAVENOUS CONTINUOUS
Status: DISCONTINUED | OUTPATIENT
Start: 2021-05-26 | End: 2021-05-26

## 2021-05-26 RX ORDER — TRAMADOL HYDROCHLORIDE 50 MG/1
TABLET ORAL
Qty: 20 TABLET | Refills: 1 | Status: SHIPPED | OUTPATIENT
Start: 2021-05-26 | End: 2021-06-04

## 2021-05-26 RX ORDER — ONDANSETRON 2 MG/ML
4 INJECTION INTRAMUSCULAR; INTRAVENOUS AS NEEDED
Status: DISCONTINUED | OUTPATIENT
Start: 2021-05-26 | End: 2021-05-26

## 2021-05-26 RX ORDER — MELOXICAM 15 MG/1
15 TABLET ORAL DAILY
Qty: 14 TABLET | Refills: 1 | Status: SHIPPED | OUTPATIENT
Start: 2021-05-26 | End: 2021-10-28

## 2021-05-26 RX ORDER — HYDROCODONE BITARTRATE AND ACETAMINOPHEN 5; 325 MG/1; MG/1
1 TABLET ORAL AS NEEDED
Status: DISCONTINUED | OUTPATIENT
Start: 2021-05-26 | End: 2021-05-26

## 2021-05-26 RX ORDER — METOCLOPRAMIDE HYDROCHLORIDE 5 MG/ML
INJECTION INTRAMUSCULAR; INTRAVENOUS AS NEEDED
Status: DISCONTINUED | OUTPATIENT
Start: 2021-05-26 | End: 2021-05-26 | Stop reason: SURG

## 2021-05-26 RX ORDER — TRANEXAMIC ACID 10 MG/ML
1000 INJECTION, SOLUTION INTRAVENOUS ONCE
Status: COMPLETED | OUTPATIENT
Start: 2021-05-26 | End: 2021-05-26

## 2021-05-26 RX ORDER — BUPIVACAINE HYDROCHLORIDE AND EPINEPHRINE 5; 5 MG/ML; UG/ML
INJECTION, SOLUTION EPIDURAL; INTRACAUDAL; PERINEURAL AS NEEDED
Status: DISCONTINUED | OUTPATIENT
Start: 2021-05-26 | End: 2021-05-26

## 2021-05-26 RX ADMIN — DEXAMETHASONE SODIUM PHOSPHATE 4 MG: 4 MG/ML VIAL (ML) INJECTION at 10:22:00

## 2021-05-26 RX ADMIN — MIDAZOLAM HYDROCHLORIDE 2 MG: 1 INJECTION INTRAMUSCULAR; INTRAVENOUS at 10:07:00

## 2021-05-26 RX ADMIN — BUPIVACAINE HYDROCHLORIDE 30 ML: 2.5 INJECTION, SOLUTION EPIDURAL; INFILTRATION; INTRACAUDAL at 10:16:00

## 2021-05-26 RX ADMIN — METOCLOPRAMIDE HYDROCHLORIDE 10 MG: 5 INJECTION INTRAMUSCULAR; INTRAVENOUS at 10:22:00

## 2021-05-26 RX ADMIN — CEFAZOLIN SODIUM 2 G: 1 INJECTION, POWDER, FOR SOLUTION INTRAMUSCULAR; INTRAVENOUS at 10:26:00

## 2021-05-26 RX ADMIN — KETOROLAC TROMETHAMINE 30 MG: 30 INJECTION, SOLUTION INTRAMUSCULAR; INTRAVENOUS at 11:25:00

## 2021-05-26 RX ADMIN — LIDOCAINE HYDROCHLORIDE 100 MG: 10 INJECTION, SOLUTION EPIDURAL; INFILTRATION; INTRACAUDAL; PERINEURAL at 10:07:00

## 2021-05-26 RX ADMIN — DEXAMETHASONE SODIUM PHOSPHATE 2 MG: 10 INJECTION, SOLUTION INTRAMUSCULAR; INTRAVENOUS at 10:16:00

## 2021-05-26 RX ADMIN — KETAMINE HYDROCHLORIDE 30 MG: 50 INJECTION, SOLUTION, CONCENTRATE INTRAMUSCULAR; INTRAVENOUS at 10:25:00

## 2021-05-26 RX ADMIN — ONDANSETRON 4 MG: 2 INJECTION INTRAMUSCULAR; INTRAVENOUS at 10:22:00

## 2021-05-26 RX ADMIN — TRANEXAMIC ACID 1000 MG: 10 INJECTION, SOLUTION INTRAVENOUS at 10:25:00

## 2021-05-26 RX ADMIN — SODIUM CHLORIDE, SODIUM LACTATE, POTASSIUM CHLORIDE, CALCIUM CHLORIDE: 600; 310; 30; 20 INJECTION, SOLUTION INTRAVENOUS at 11:43:00

## 2021-05-26 NOTE — ANESTHESIA PROCEDURE NOTES
Airway  Urgency: elective      General Information and Staff    Patient location during procedure: OR  Anesthesiologist: Lali Escobedo MD  Performed: anesthesiologist     Indications and Patient Condition  Indications for airway management: anesthe

## 2021-05-26 NOTE — ANESTHESIA POSTPROCEDURE EVALUATION
[facility]    Deyanira Levi Patient Status:  Hospital Outpatient Surgery   Age/Gender 39year old male MRN [id number]   [facility] SURGERY Attending Iliana Gutiérrez MD   Hosp Day # 0 PCP Bud Langford MD       Anesthesia Post occur. Will give labetalol x1 and ok for dc for follow-up with PMD.    Dental Exam: Unchanged from Preop    Patient to be discharged from PACU when criteria met.

## 2021-05-26 NOTE — BRIEF OP NOTE
Pre-Operative Diagnosis: Complex tear of lateral meniscus of left knee as current injury, initial encounter [S83.272A]  Osteochondral defect [M95.8]  Loose body in knee, left knee [M23.42]     Post-Operative Diagnosis: * No post-op diagnosis entered *

## 2021-05-26 NOTE — ANESTHESIA PROCEDURE NOTES
[facility]  Performed by: Altaf Lechuga MD  Authorized by: Altaf Lechuga MD       General Information and Staff    Start Time:  5/26/2021 10:16 AM  End Time:  5/26/2021 10:17 AM  Anesthesiologist:  Altaf Lechuga MD  Performed by

## 2021-05-26 NOTE — ANESTHESIA PREPROCEDURE EVALUATION
PRE-OP EVALUATION    Patient Name: Luz Elena Rayo    Admit Diagnosis: Complex tear of lateral meniscus of left knee as current injury, initial encounter [S83.675A]  Osteochondral defect [M95.8]  Loose body in knee, left knee [M23.42]    Pre-op Diagnos KIN.                       Neuro/Psych                   (+) headaches                 Past Surgical History:   Procedure Laterality Date   • KNEE SURGERY  1993    Left knee Arthroscopy   • WISDOM TEETH REMOVED       Social History    Tobacco Use      Smok

## 2021-05-27 ENCOUNTER — OFFICE VISIT (OUTPATIENT)
Dept: PHYSICAL THERAPY | Age: 36
End: 2021-05-27
Attending: ORTHOPAEDIC SURGERY
Payer: COMMERCIAL

## 2021-05-27 DIAGNOSIS — M23.42 LOOSE BODY IN KNEE, LEFT KNEE: ICD-10-CM

## 2021-05-27 DIAGNOSIS — M95.8 OSTEOCHONDRAL DEFECT: ICD-10-CM

## 2021-05-27 DIAGNOSIS — S83.272A COMPLEX TEAR OF LATERAL MENISCUS OF LEFT KNEE AS CURRENT INJURY, INITIAL ENCOUNTER: ICD-10-CM

## 2021-05-27 PROCEDURE — 97110 THERAPEUTIC EXERCISES: CPT | Performed by: PHYSICAL THERAPIST

## 2021-05-27 PROCEDURE — 97161 PT EVAL LOW COMPLEX 20 MIN: CPT | Performed by: PHYSICAL THERAPIST

## 2021-05-27 NOTE — PROGRESS NOTES
POST-OP KNEE EVALUATION:   Referring Physician: Dr. Romina Massey  Diagnosis: Complex tear of lateral meniscus of L knee, osteochondral defect s/p arthroscopy 5/26/2021     Date of Service: 5/27/2021     PATIENT SUMMARY   Coty Monteiro is a 39year old mal to [facility]. Precautions:  WBAT, no ROM restrictions, begin jogging in wk 1-6, plyometrics, agilitiy, sports specific training wks 6-12. OBJECTIVE:   Observation/Skin: incision sites covered with tegaderm.   Palpation: no palpable tendern improve knee extension ROM to 0 deg to allow proper heel strike during gait and terminal knee extension in stance  · Pt will improve knee AROM flexion to >130 degrees to improve ability to perform sit to stand from low surfaces  · Pt will improve quad stre

## 2021-05-28 NOTE — OPERATIVE REPORT
[facility] OPERATIVE RECORD  ATTENDING SURGEON: Marlyn Iraheta MD  ASSISTANT(S): Howard Motta Lilly, [facility]  PRELIMINARY DIAGNOSIS:  1.    Status post left patellar dislocation  2.     Left patellar chondral shear injury  3 performed of chondral areas and biopsy of cartilage obtained. Indications:  Vivi Lorenzo is a 39year old male who sustained a acute traumatic left patellar dislocation when he was playing with his kids and a 9year-old jumped on his back.   This subsequently l imaging was reviewed. Exam under anesthesia demonstrated a Stable knee with normal range of motion. There was no evidence of J sign. There was slightly increased patellar lateral laxity compared to the contralateral side approximately 2-3 quadrants. instruments including a pituitary grasper as well as a straight Missy clamp. One of the loose bodies was broken into half to facilitate extraction.     Direction was then made to the patellofemoral compartment and a chondroplasty was performed along the in physical therapy, and a copy of my rehabilitation guidelines. POST OPERATIVE PLAN:  1. Activity Precautions: WBAT / Terri Pane. To begin physical therapy ASAP. No knee brace indicated  2. DVT Prophylaxis: Not indicated as patient is ambulatory.    3. Karen Dela Cruz

## 2021-06-02 ENCOUNTER — OFFICE VISIT (OUTPATIENT)
Dept: PHYSICAL THERAPY | Age: 36
End: 2021-06-02
Attending: ORTHOPAEDIC SURGERY
Payer: COMMERCIAL

## 2021-06-02 PROCEDURE — 97110 THERAPEUTIC EXERCISES: CPT | Performed by: PHYSICAL THERAPIST

## 2021-06-02 NOTE — PROGRESS NOTES
Dx: L knee partial lateral menisectomy, removal of loose body 5/26/21         Authorized # of Visits:  n/a         Next MD visit: 6/4/21  Fall Risk: standard         Precautions: n/a             Subjective: Pt states he was a little sore once anesthesia wo Charges: there ex X 3       Total Timed Treatment: 45 min  Total Treatment Time: 45 min

## 2021-06-04 ENCOUNTER — OFFICE VISIT (OUTPATIENT)
Dept: ORTHOPEDICS CLINIC | Facility: CLINIC | Age: 36
End: 2021-06-04
Payer: COMMERCIAL

## 2021-06-04 VITALS — OXYGEN SATURATION: 99 % | HEART RATE: 71 BPM

## 2021-06-04 DIAGNOSIS — Z98.890 S/P ARTHROSCOPY OF KNEE: Primary | ICD-10-CM

## 2021-06-04 PROCEDURE — 99024 POSTOP FOLLOW-UP VISIT: CPT | Performed by: ORTHOPAEDIC SURGERY

## 2021-06-07 ENCOUNTER — APPOINTMENT (OUTPATIENT)
Dept: PHYSICAL THERAPY | Age: 36
End: 2021-06-07
Attending: ORTHOPAEDIC SURGERY
Payer: COMMERCIAL

## 2021-06-07 NOTE — PROGRESS NOTES
[facility]  [address]     Name: Ted Wade   MRN: [id number]  Date: 6/4/2021     REASON FOR VISIT: First Post-Surgical Visit  Date of Surgery: 5/26/2021 -lef and was closed with a nylon stitch in clinic and a Steri-Strip was applied. Otherwise minimal effusion and well-appearing skin incisions adjacent to the anterolateral portal.    No obvious peripheral edema noted.    Distal neurovascular exam demonstrates n

## 2021-06-08 ENCOUNTER — OFFICE VISIT (OUTPATIENT)
Dept: PHYSICAL THERAPY | Age: 36
End: 2021-06-08
Attending: ORTHOPAEDIC SURGERY
Payer: COMMERCIAL

## 2021-06-08 PROCEDURE — 97140 MANUAL THERAPY 1/> REGIONS: CPT | Performed by: PHYSICAL THERAPIST

## 2021-06-08 PROCEDURE — 97110 THERAPEUTIC EXERCISES: CPT | Performed by: PHYSICAL THERAPIST

## 2021-06-08 NOTE — PROGRESS NOTES
Dx: L knee partial lateral menisectomy, removal of loose body 5/26/21         Authorized # of Visits:  n/a         Next MD visit: 6/4/21  Fall Risk: standard         Precautions: n/a             Subjective: Pt had MD appt last Friday.  Needed a stitch when Toe raises X 20 Toe raises X 20        Quad sets X 20 Standing TKE with blue TB 5 sec hold 2 X 10         Heel slides on board X 20 Single leg balance on airex 10 sec X 10        SLR L X 20 MAN THERE 15 MINS        Side lying hip abd 2 X 10 L STM to

## 2021-06-10 ENCOUNTER — OFFICE VISIT (OUTPATIENT)
Dept: PHYSICAL THERAPY | Age: 36
End: 2021-06-10
Attending: ORTHOPAEDIC SURGERY
Payer: COMMERCIAL

## 2021-06-10 PROCEDURE — 97140 MANUAL THERAPY 1/> REGIONS: CPT | Performed by: PHYSICAL THERAPIST

## 2021-06-10 PROCEDURE — 97110 THERAPEUTIC EXERCISES: CPT | Performed by: PHYSICAL THERAPIST

## 2021-06-10 NOTE — PROGRESS NOTES
Dx: L knee partial lateral menisectomy, removal of loose body 5/26/21         Authorized # of Visits:  n/a         Next MD visit: 6/4/21  Fall Risk: standard         Precautions: n/a             Subjective: Pt states he has been on his feet a lot since las down dip without  UE support 2\" step X 20 Step down dip 4\" 2 X 10 with UE support       Heel raises X 20 Heel raises X 20 Heel raises X 20       Toe raises X 20 Toe raises X 20 Toe raises X 20       Quad sets X 20 Standing TKE with blue TB 5 sec hold 2 X

## 2021-06-11 ENCOUNTER — OFFICE VISIT (OUTPATIENT)
Dept: ORTHOPEDICS CLINIC | Facility: CLINIC | Age: 36
End: 2021-06-11
Payer: COMMERCIAL

## 2021-06-11 DIAGNOSIS — Z98.890 S/P ARTHROSCOPY OF KNEE: Primary | ICD-10-CM

## 2021-06-11 PROCEDURE — 99024 POSTOP FOLLOW-UP VISIT: CPT | Performed by: ORTHOPAEDIC SURGERY

## 2021-06-15 ENCOUNTER — OFFICE VISIT (OUTPATIENT)
Dept: PHYSICAL THERAPY | Age: 36
End: 2021-06-15
Attending: ORTHOPAEDIC SURGERY
Payer: COMMERCIAL

## 2021-06-15 PROCEDURE — 97110 THERAPEUTIC EXERCISES: CPT

## 2021-06-15 PROCEDURE — 97140 MANUAL THERAPY 1/> REGIONS: CPT

## 2021-06-15 PROCEDURE — 97112 NEUROMUSCULAR REEDUCATION: CPT

## 2021-06-15 NOTE — PROGRESS NOTES
Dx: L knee partial lateral menisectomy, removal of loose body 5/26/21         Authorized # of Visits:  n/a         Next MD visit: August 11  Fall Risk: standard         Precautions: n/a             Subjective: Ended up working a bit more than expected with X 20 Step down dip without  UE support 2\" step X 20 Step down dip 4\" 2 X 10 with UE support SLR 10x2    Ham stretch 30\"x3      Heel raises X 20 Heel raises X 20 Heel raises X 20 Shuttle double leg  37 lbs 15x   25 lbs 15x       Toe raises X 20 Toe raise

## 2021-06-21 NOTE — PROGRESS NOTES
[facility]  [address] [place] [phone]     Name: Taylor Boogie   MRN: [id number]  Date: 6/11/21    REASON FOR VISIT: Post-Surgical Visit  Date of Surgery: 5/26/2021 -left knee a healed at this assessment. Normalizing gait. Mild discomfort with deep knee flexion. No obvious peripheral edema noted. Distal neurovascular exam demonstrates normal perfusion, intact sensation to light touch and full strength.      Examination of

## 2021-06-22 ENCOUNTER — OFFICE VISIT (OUTPATIENT)
Dept: PHYSICAL THERAPY | Age: 36
End: 2021-06-22
Attending: ORTHOPAEDIC SURGERY
Payer: COMMERCIAL

## 2021-06-22 PROCEDURE — 97110 THERAPEUTIC EXERCISES: CPT | Performed by: PHYSICAL THERAPIST

## 2021-06-22 PROCEDURE — 97140 MANUAL THERAPY 1/> REGIONS: CPT | Performed by: PHYSICAL THERAPIST

## 2021-06-22 NOTE — PROGRESS NOTES
Dx: L knee partial lateral menisectomy, removal of loose body 5/26/21         Authorized # of Visits:  n/a         Next MD visit: August 11  Fall Risk: standard         Precautions: n/a             Subjective: Ended up working a bit more than expected with Slant board calf stretch 4x Slant board stretch 30 sec X 3     Step up fwd 4\" X 20 Step up fwd 6\" X 20 Step up fwd 4\" X 20 6\" FSU 20x 6\" forward step up X 20     Step up lat 4\" X 20 Step up lat 6\" X 20 Step up lat 4\" X 20 6\" LSU 20x 6\" lateral st

## 2021-06-23 ENCOUNTER — APPOINTMENT (OUTPATIENT)
Dept: PHYSICAL THERAPY | Age: 36
End: 2021-06-23
Attending: ORTHOPAEDIC SURGERY
Payer: COMMERCIAL

## 2021-06-28 ENCOUNTER — APPOINTMENT (OUTPATIENT)
Dept: PHYSICAL THERAPY | Age: 36
End: 2021-06-28
Attending: ORTHOPAEDIC SURGERY
Payer: COMMERCIAL

## 2021-06-30 ENCOUNTER — OFFICE VISIT (OUTPATIENT)
Dept: PHYSICAL THERAPY | Age: 36
End: 2021-06-30
Attending: ORTHOPAEDIC SURGERY
Payer: COMMERCIAL

## 2021-06-30 PROCEDURE — 97140 MANUAL THERAPY 1/> REGIONS: CPT | Performed by: PHYSICAL THERAPIST

## 2021-06-30 PROCEDURE — 97110 THERAPEUTIC EXERCISES: CPT | Performed by: PHYSICAL THERAPIST

## 2021-07-01 NOTE — PROGRESS NOTES
Dx: L knee partial lateral menisectomy, removal of loose body 5/26/21         Authorized # of Visits:  n/a         Next MD visit: August 11  Fall Risk: standard         Precautions: n/a             Subjective: Pt states his knee is getting better every vis stretch 30 sec X 3 Slant board stretch 30 sec X 3 Slant board calf stretch 4x Slant board stretch 30 sec X 3 Slant board stretch 30 sec X 3    Step up fwd 4\" X 20 Step up fwd 6\" X 20 Step up fwd 4\" X 20 6\" FSU 20x 6\" forward step up X 20 8\" forward s

## 2021-07-07 ENCOUNTER — APPOINTMENT (OUTPATIENT)
Dept: PHYSICAL THERAPY | Age: 36
End: 2021-07-07
Attending: ORTHOPAEDIC SURGERY
Payer: COMMERCIAL

## 2021-07-14 ENCOUNTER — OFFICE VISIT (OUTPATIENT)
Dept: PHYSICAL THERAPY | Age: 36
End: 2021-07-14
Attending: ORTHOPAEDIC SURGERY
Payer: COMMERCIAL

## 2021-07-14 PROCEDURE — 97110 THERAPEUTIC EXERCISES: CPT | Performed by: PHYSICAL THERAPIST

## 2021-07-14 NOTE — PROGRESS NOTES
Dx: L knee partial lateral menisectomy, removal of loose body 5/26/21         Authorized # of Visits:  n/a         Next MD visit: August 11  Fall Risk: standard         Precautions: n/a             Subjective: Pt states his knee is getting better every vis forward step up X 20 BOSU high step up X 20       6\" lateral step up X 20 8\" lateral step up X 20 BOSU lateral step up and over X 20       TKE with blue TB in standing X 20 Single leg vector reach 8 directions on dots X 10 Single leg vector reach 8 direc

## 2021-07-19 ENCOUNTER — APPOINTMENT (OUTPATIENT)
Dept: PHYSICAL THERAPY | Age: 36
End: 2021-07-19
Attending: ORTHOPAEDIC SURGERY
Payer: COMMERCIAL

## 2021-07-26 ENCOUNTER — APPOINTMENT (OUTPATIENT)
Dept: PHYSICAL THERAPY | Age: 36
End: 2021-07-26
Attending: ORTHOPAEDIC SURGERY
Payer: COMMERCIAL

## 2021-08-02 ENCOUNTER — APPOINTMENT (OUTPATIENT)
Dept: PHYSICAL THERAPY | Age: 36
End: 2021-08-02
Attending: FAMILY MEDICINE
Payer: COMMERCIAL

## 2021-08-11 ENCOUNTER — OFFICE VISIT (OUTPATIENT)
Dept: ORTHOPEDICS CLINIC | Facility: CLINIC | Age: 36
End: 2021-08-11
Payer: COMMERCIAL

## 2021-08-11 VITALS — OXYGEN SATURATION: 98 % | HEART RATE: 77 BPM

## 2021-08-11 DIAGNOSIS — Z98.890 S/P ARTHROSCOPY OF KNEE: Primary | ICD-10-CM

## 2021-08-11 PROCEDURE — 99024 POSTOP FOLLOW-UP VISIT: CPT | Performed by: ORTHOPAEDIC SURGERY

## 2021-08-11 NOTE — PROGRESS NOTES
[facility]  [address]     Name: Anh Moreno   MRN: [id number]  Date: 8/11/21    REASON FOR VISIT: Second Post-Surgical Visit  Date of Surgery: 5/26/2021 -left this assessment. Normalizing gait. Examination of the contralateral knee demonstrates:  No significant atrophy, swelling or effusion. Full range of motion. Neurovascularly intact distally.       IMPRESSION: Didi Saenz is a 39year old male PO

## 2021-08-11 NOTE — PROGRESS NOTES
[facility]  [address]     Name: Gisela Padron   MRN: [id number]  Date: 8/11/2021    REASON FOR VISIT: Post-Surgical Visit  Date of Surgery: 5/26/2021 -left knee No obvious peripheral edema noted. Distal neurovascular exam demonstrates normal perfusion, intact sensation to light touch and full strength. Examination of the contralateral knee demonstrates:  No significant atrophy, swelling or effusion.  Full ran

## 2021-09-21 DIAGNOSIS — E03.9 HYPOTHYROIDISM, UNSPECIFIED TYPE: ICD-10-CM

## 2021-09-21 RX ORDER — LEVOTHYROXINE SODIUM 0.05 MG/1
50 TABLET ORAL
Qty: 90 TABLET | Refills: 0 | Status: SHIPPED | OUTPATIENT
Start: 2021-09-21 | End: 2021-12-18

## 2021-09-21 RX ORDER — HYDROCHLOROTHIAZIDE 25 MG/1
25 TABLET ORAL DAILY
Qty: 90 TABLET | Refills: 0 | Status: SHIPPED | OUTPATIENT
Start: 2021-09-21 | End: 2021-12-18

## 2021-10-28 ENCOUNTER — OFFICE VISIT (OUTPATIENT)
Dept: SURGERY | Facility: CLINIC | Age: 36
End: 2021-10-28
Payer: COMMERCIAL

## 2021-10-28 DIAGNOSIS — Z30.09 STERILIZATION CONSULT: Primary | ICD-10-CM

## 2021-10-28 PROCEDURE — 99243 OFF/OP CNSLTJ NEW/EST LOW 30: CPT | Performed by: UROLOGY

## 2021-10-28 NOTE — PROGRESS NOTES
Grecia Fay is a 39year old male. Patient presents with:  Consult: discuss vasectomy    Patient has 2 children and spouse is pregnant.     HPI:         Current Outpatient Medications   Medication Sig Dispense Refill   • levothyroxine 50 MCG Oral T clubbing or edema    LAB:     Lab Results   Component Value Date    WBC 7.3 11/14/2019    HGB 15.8 11/14/2019    HCT 47.9 11/14/2019    .0 11/14/2019    CREATSERUM 1.10 05/24/2021    BUN 19 (H) 05/24/2021     05/24/2021    K 4.2 05/24/2021 sutures. Then a similar procedure was performed on the left side in the same manner. The specimens were inspected and then discarded.   The scrotum was then cleaned with sterile water, bacitracin ointment applied over the incisions, and then sterile dress

## 2021-11-18 ENCOUNTER — TELEPHONE (OUTPATIENT)
Dept: SURGERY | Facility: CLINIC | Age: 36
End: 2021-11-18

## 2021-11-18 NOTE — TELEPHONE ENCOUNTER
Patient is scheduled for in office vasectomy CPT [phone] on Friday, November 26, 2021. Insurance is [employer] PPO subscriber id: [id number] group number: [id number]. CPT 68628 in office does not require prior authorization and is a covered benefit.

## 2021-11-26 ENCOUNTER — PROCEDURE (OUTPATIENT)
Dept: SURGERY | Facility: CLINIC | Age: 36
End: 2021-11-26
Payer: COMMERCIAL

## 2021-11-26 VITALS — SYSTOLIC BLOOD PRESSURE: 161 MMHG | HEART RATE: 82 BPM | DIASTOLIC BLOOD PRESSURE: 106 MMHG

## 2021-11-26 DIAGNOSIS — Z98.52 STATUS POST VASECTOMY: Primary | ICD-10-CM

## 2021-11-26 DIAGNOSIS — Z30.2 ENCOUNTER FOR STERILIZATION: ICD-10-CM

## 2021-11-26 PROCEDURE — 3077F SYST BP >= 140 MM HG: CPT | Performed by: UROLOGY

## 2021-11-26 PROCEDURE — 55250 REMOVAL OF SPERM DUCT(S): CPT | Performed by: UROLOGY

## 2021-11-26 PROCEDURE — 3080F DIAST BP >= 90 MM HG: CPT | Performed by: UROLOGY

## 2021-12-18 DIAGNOSIS — E03.9 HYPOTHYROIDISM, UNSPECIFIED TYPE: ICD-10-CM

## 2021-12-19 RX ORDER — LEVOTHYROXINE SODIUM 0.05 MG/1
50 TABLET ORAL
Qty: 90 TABLET | Refills: 0 | Status: SHIPPED | OUTPATIENT
Start: 2021-12-19

## 2021-12-19 RX ORDER — HYDROCHLOROTHIAZIDE 25 MG/1
25 TABLET ORAL DAILY
Qty: 90 TABLET | Refills: 0 | Status: SHIPPED | OUTPATIENT
Start: 2021-12-19

## 2023-02-14 ENCOUNTER — TELEPHONE (OUTPATIENT)
Dept: FAMILY MEDICINE CLINIC | Facility: CLINIC | Age: 38
End: 2023-02-14

## 2023-02-20 ENCOUNTER — TELEPHONE (OUTPATIENT)
Dept: FAMILY MEDICINE CLINIC | Facility: CLINIC | Age: 38
End: 2023-02-20

## 2023-03-02 ENCOUNTER — PATIENT OUTREACH (OUTPATIENT)
Dept: CASE MANAGEMENT | Age: 38
End: 2023-03-02

## 2023-03-02 NOTE — PROCEDURES
The office order for PCP removal request is Approved and finalized on March 2, 2023.     Thanks,  [address] MITCHELL [facility] [employer]

## 2023-04-04 ENCOUNTER — OFFICE VISIT (OUTPATIENT)
Dept: FAMILY MEDICINE | Age: 38
End: 2023-04-04

## 2023-04-04 VITALS
HEART RATE: 73 BPM | OXYGEN SATURATION: 98 % | DIASTOLIC BLOOD PRESSURE: 90 MMHG | TEMPERATURE: 98.3 F | SYSTOLIC BLOOD PRESSURE: 138 MMHG | WEIGHT: 181 LBS | BODY MASS INDEX: 25.34 KG/M2 | HEIGHT: 71 IN

## 2023-04-04 DIAGNOSIS — L40.9 PSORIASIS: ICD-10-CM

## 2023-04-04 DIAGNOSIS — Z86.39 HISTORY OF HYPOTHYROIDISM: ICD-10-CM

## 2023-04-04 DIAGNOSIS — I10 PRIMARY HYPERTENSION: ICD-10-CM

## 2023-04-04 DIAGNOSIS — Z00.00 HEALTH CARE MAINTENANCE: Primary | ICD-10-CM

## 2023-04-04 PROBLEM — E03.9 HYPOTHYROIDISM: Status: RESOLVED | Noted: 2019-12-19 | Resolved: 2023-04-04

## 2023-04-04 PROBLEM — E03.9 HYPOTHYROIDISM: Status: ACTIVE | Noted: 2019-12-19

## 2023-04-04 PROCEDURE — 99385 PREV VISIT NEW AGE 18-39: CPT | Performed by: INTERNAL MEDICINE

## 2023-04-04 PROCEDURE — 99204 OFFICE O/P NEW MOD 45 MIN: CPT | Performed by: INTERNAL MEDICINE

## 2023-04-04 PROCEDURE — 90471 IMMUNIZATION ADMIN: CPT | Performed by: INTERNAL MEDICINE

## 2023-04-04 PROCEDURE — 3075F SYST BP GE 130 - 139MM HG: CPT | Performed by: INTERNAL MEDICINE

## 2023-04-04 PROCEDURE — 3080F DIAST BP >= 90 MM HG: CPT | Performed by: INTERNAL MEDICINE

## 2023-04-04 PROCEDURE — 90715 TDAP VACCINE 7 YRS/> IM: CPT | Performed by: INTERNAL MEDICINE

## 2023-04-04 RX ORDER — HYDROCHLOROTHIAZIDE 25 MG/1
25 TABLET ORAL DAILY
Qty: 90 TABLET | Refills: 3 | Status: SHIPPED | OUTPATIENT
Start: 2023-04-04

## 2023-04-04 RX ORDER — BETAMETHASONE DIPROPIONATE 0.5 MG/G
1 LOTION TOPICAL 2 TIMES DAILY PRN
Qty: 60 ML | Refills: 0 | Status: SHIPPED | OUTPATIENT
Start: 2023-04-04

## 2023-04-04 ASSESSMENT — PATIENT HEALTH QUESTIONNAIRE - PHQ9
1. LITTLE INTEREST OR PLEASURE IN DOING THINGS: NOT AT ALL
SUM OF ALL RESPONSES TO PHQ9 QUESTIONS 1 AND 2: 0
2. FEELING DOWN, DEPRESSED OR HOPELESS: NOT AT ALL
CLINICAL INTERPRETATION OF PHQ2 SCORE: NO FURTHER SCREENING NEEDED
SUM OF ALL RESPONSES TO PHQ9 QUESTIONS 1 AND 2: 0
SUM OF ALL RESPONSES TO PHQ9 QUESTIONS 1 AND 2: 0
2. FEELING DOWN, DEPRESSED OR HOPELESS: NOT AT ALL
CLINICAL INTERPRETATION OF PHQ2 SCORE: NO FURTHER SCREENING NEEDED
1. LITTLE INTEREST OR PLEASURE IN DOING THINGS: NOT AT ALL
SUM OF ALL RESPONSES TO PHQ9 QUESTIONS 1 AND 2: 0

## 2024-04-13 DIAGNOSIS — I10 PRIMARY HYPERTENSION: ICD-10-CM

## 2024-04-15 ENCOUNTER — E-ADVICE (OUTPATIENT)
Dept: FAMILY MEDICINE | Age: 39
End: 2024-04-15

## 2024-04-15 RX ORDER — HYDROCHLOROTHIAZIDE 25 MG/1
25 TABLET ORAL DAILY
Qty: 90 TABLET | Refills: 3 | OUTPATIENT
Start: 2024-04-15

## 2024-05-17 DIAGNOSIS — Z79.899 MEDICATION MANAGEMENT: Primary | ICD-10-CM

## 2024-05-17 DIAGNOSIS — I10 PRIMARY HYPERTENSION: ICD-10-CM

## 2024-05-18 RX ORDER — HYDROCHLOROTHIAZIDE 25 MG/1
25 TABLET ORAL DAILY
Qty: 90 TABLET | Refills: 0 | Status: SHIPPED | OUTPATIENT
Start: 2024-05-18

## 2024-05-21 ENCOUNTER — APPOINTMENT (OUTPATIENT)
Dept: FAMILY MEDICINE | Age: 39
End: 2024-05-21

## 2024-08-16 DIAGNOSIS — I10 PRIMARY HYPERTENSION: ICD-10-CM

## 2024-08-16 RX ORDER — HYDROCHLOROTHIAZIDE 25 MG/1
25 TABLET ORAL DAILY
Qty: 90 TABLET | Refills: 0 | OUTPATIENT
Start: 2024-08-16

## 2024-09-12 DIAGNOSIS — I10 PRIMARY HYPERTENSION: ICD-10-CM

## 2024-09-13 RX ORDER — HYDROCHLOROTHIAZIDE 25 MG/1
25 TABLET ORAL DAILY
Qty: 90 TABLET | Refills: 0 | Status: SHIPPED | OUTPATIENT
Start: 2024-09-13

## 2024-10-22 ENCOUNTER — E-ADVICE (OUTPATIENT)
Dept: FAMILY MEDICINE | Age: 39
End: 2024-10-22

## 2024-10-29 SDOH — ECONOMIC STABILITY: TRANSPORTATION INSECURITY
IN THE PAST 12 MONTHS, HAS LACK OF RELIABLE TRANSPORTATION KEPT YOU FROM MEDICAL APPOINTMENTS, MEETINGS, WORK OR FROM GETTING THINGS NEEDED FOR DAILY LIVING?: NO

## 2024-10-29 SDOH — ECONOMIC STABILITY: GENERAL: WOULD YOU LIKE HELP WITH ANY OF THE FOLLOWING NEEDS?: I DON'T WANT HELP WITH ANY OF THESE

## 2024-10-29 SDOH — ECONOMIC STABILITY: HOUSING INSECURITY: DO YOU HAVE PROBLEMS WITH ANY OF THE FOLLOWING?: NONE OF THE ABOVE

## 2024-10-29 SDOH — ECONOMIC STABILITY: FOOD INSECURITY: WITHIN THE PAST 12 MONTHS, THE FOOD YOU BOUGHT JUST DIDN'T LAST AND YOU DIDN'T HAVE MONEY TO GET MORE.: NEVER TRUE

## 2024-10-29 SDOH — ECONOMIC STABILITY: FOOD INSECURITY: WITHIN THE PAST 12 MONTHS, THE FOOD YOU BOUGHT JUST DIDN'T LAST AND YOU DIDN'T HAVE MONEY TO GET MORE.: SOMETIMES TRUE

## 2024-10-29 SDOH — ECONOMIC STABILITY: HOUSING INSECURITY: WHAT IS YOUR LIVING SITUATION TODAY?: I HAVE A STEADY PLACE TO LIVE

## 2024-10-29 ASSESSMENT — SOCIAL DETERMINANTS OF HEALTH (SDOH): IN THE PAST 12 MONTHS, HAS THE ELECTRIC, GAS, OIL, OR WATER COMPANY THREATENED TO SHUT OFF SERVICE IN YOUR HOME?: NO

## 2024-10-30 ENCOUNTER — APPOINTMENT (OUTPATIENT)
Dept: FAMILY MEDICINE | Age: 39
End: 2024-10-30

## 2024-10-30 DIAGNOSIS — Z00.01 ENCOUNTER FOR GENERAL ADULT MEDICAL EXAMINATION WITH ABNORMAL FINDINGS: Primary | ICD-10-CM

## 2024-10-30 DIAGNOSIS — Z79.899 MEDICATION MANAGEMENT: ICD-10-CM

## 2024-10-30 DIAGNOSIS — I10 PRIMARY HYPERTENSION: ICD-10-CM

## 2024-10-30 ASSESSMENT — PATIENT HEALTH QUESTIONNAIRE - PHQ9
1. LITTLE INTEREST OR PLEASURE IN DOING THINGS: NOT AT ALL
CLINICAL INTERPRETATION OF PHQ2 SCORE: NO FURTHER SCREENING NEEDED
SUM OF ALL RESPONSES TO PHQ9 QUESTIONS 1 AND 2: 0
2. FEELING DOWN, DEPRESSED OR HOPELESS: NOT AT ALL
SUM OF ALL RESPONSES TO PHQ9 QUESTIONS 1 AND 2: 0

## 2024-10-30 ASSESSMENT — ENCOUNTER SYMPTOMS
ROS SKIN COMMENTS: HAIR LOSS
FACIAL ASYMMETRY: 0
VOMITING: 0
SHORTNESS OF BREATH: 0
FATIGUE: 0
CHILLS: 0
CONFUSION: 0
LIGHT-HEADEDNESS: 0
DIZZINESS: 0
AGITATION: 0
HEADACHES: 0
EYE REDNESS: 0
ADENOPATHY: 0
BLOOD IN STOOL: 0
NAUSEA: 0
POLYDIPSIA: 0
UNEXPECTED WEIGHT CHANGE: 0
COUGH: 0
POLYPHAGIA: 0
CHEST TIGHTNESS: 0
SINUS PAIN: 0
APPETITE CHANGE: 0
EYE DISCHARGE: 0
EYE ITCHING: 0
CONSTIPATION: 0
RHINORRHEA: 0
DIARRHEA: 0
ABDOMINAL DISTENTION: 0
FEVER: 0
SORE THROAT: 0
SINUS PRESSURE: 0
DIAPHORESIS: 0
EYE PAIN: 0
WHEEZING: 0
FACIAL SWELLING: 0
NUMBNESS: 0
BRUISES/BLEEDS EASILY: 0
WEAKNESS: 0
ACTIVITY CHANGE: 0
COLOR CHANGE: 0
ABDOMINAL PAIN: 0

## 2024-11-07 VITALS
OXYGEN SATURATION: 98 % | DIASTOLIC BLOOD PRESSURE: 80 MMHG | SYSTOLIC BLOOD PRESSURE: 138 MMHG | BODY MASS INDEX: 25.11 KG/M2 | HEIGHT: 71 IN | TEMPERATURE: 98.6 F | WEIGHT: 179.4 LBS | HEART RATE: 84 BPM

## 2024-12-10 DIAGNOSIS — I10 PRIMARY HYPERTENSION: ICD-10-CM

## 2024-12-10 RX ORDER — HYDROCHLOROTHIAZIDE 25 MG/1
25 TABLET ORAL DAILY
Qty: 90 TABLET | Refills: 2 | Status: SHIPPED | OUTPATIENT
Start: 2024-12-10

## 2024-12-13 ENCOUNTER — E-ADVICE (OUTPATIENT)
Dept: ADMINISTRATIVE | Age: 39
End: 2024-12-13

## 2024-12-16 DIAGNOSIS — I10 PRIMARY HYPERTENSION: ICD-10-CM

## 2024-12-16 RX ORDER — HYDROCHLOROTHIAZIDE 25 MG/1
25 TABLET ORAL DAILY
Qty: 90 TABLET | Refills: 2 | Status: SHIPPED | OUTPATIENT
Start: 2024-12-16

## (undated) DEVICE — COVER,MAYO STAND,STERILE: Brand: MEDLINE

## (undated) DEVICE — #15 STERILE STAINLESS BLADE: Brand: STERILE STAINLESS BLADES

## (undated) DEVICE — STERILE POLYISOPRENE POWDER-FREE SURGICAL GLOVES: Brand: PROTEXIS

## (undated) DEVICE — 3M™ TEGADERM™ +PAD FILM DRESSING WITH NON-ADHERENT PAD, 3587, 3-1/2 IN X 4-1/8 IN (9 CM X 10.5 CM), 25/CAR, 4 CAR/CS: Brand: 3M™ TEGADERM™

## (undated) DEVICE — 3M™ TEGADERM™ +PAD FILM DRESSING WITH NON-ADHERENT PAD, 3584, 2-3/8 IN X 4 IN (6 CM X 10 CM), 50/CAR, 4 CAR/CS: Brand: 3M™ TEGADERM™

## (undated) DEVICE — DRAPE HALF 40X58 DYNJP2410

## (undated) DEVICE — SCD SLEEVE KNEE HI BLEND

## (undated) DEVICE — SUTURE MONOCRYL 3-0 PS-2

## (undated) DEVICE — 3M™ STERI-STRIP™ REINFORCED ADHESIVE SKIN CLOSURES, R1547, 1/2 IN X 4 IN (12 MM X 100 MM), 6 STRIPS/ENVELOPE: Brand: 3M™ STERI-STRIP™

## (undated) DEVICE — 3M™ IOBAN™ 2 ANTIMICROBIAL INCISE DRAPE 6650EZ: Brand: IOBAN™ 2

## (undated) DEVICE — MEDI-VAC NON-CONDUCTIVE SUCTION TUBING: Brand: CARDINAL HEALTH

## (undated) DEVICE — STERILE POLYISOPRENE POWDER-FREE SURGICAL GLOVES WITH EMOLLIENT COATING: Brand: PROTEXIS

## (undated) DEVICE — GAUZE SPONGES,12 PLY: Brand: CURITY

## (undated) DEVICE — ALCOHOL 70% 4 OZ

## (undated) DEVICE — KNEE ARTHROSCOPY CDS: Brand: MEDLINE INDUSTRIES, INC.

## (undated) DEVICE — STANDARD HYPODERMIC NEEDLE,POLYPROPYLENE HUB: Brand: MONOJECT

## (undated) DEVICE — AGGRESSIVE PLUS, ANGLED CUTTER: Brand: FORMULA

## (undated) DEVICE — 1010 S-DRAPE TOWEL DRAPE 10/BX: Brand: STERI-DRAPE™

## (undated) DEVICE — SUPER TURBOVAC 90 IFS: Brand: COBLATION

## (undated) DEVICE — EXOFIN TISSUE ADHESIVE 1.0ML

## (undated) DEVICE — SOL  .9 3000ML

## (undated) DEVICE — Device

## (undated) DEVICE — 10K/24K ARTHROSCOPY INFLOW TUBE SET: Brand: 10K/24K